# Patient Record
Sex: FEMALE | Race: BLACK OR AFRICAN AMERICAN | URBAN - METROPOLITAN AREA
[De-identification: names, ages, dates, MRNs, and addresses within clinical notes are randomized per-mention and may not be internally consistent; named-entity substitution may affect disease eponyms.]

---

## 2020-09-11 ENCOUNTER — LAB SERVICES (OUTPATIENT)
Dept: LAB | Age: 25
End: 2020-09-11

## 2020-09-11 DIAGNOSIS — Z01.812 ENCOUNTER FOR PREPROCEDURAL LABORATORY EXAMINATION: ICD-10-CM

## 2020-09-11 DIAGNOSIS — Z01.812 ENCOUNTER FOR PREPROCEDURAL LABORATORY EXAMINATION: Primary | ICD-10-CM

## 2020-09-11 PROCEDURE — 87635 SARS-COV-2 COVID-19 AMP PRB: CPT | Performed by: INTERNAL MEDICINE

## 2020-09-12 LAB
SARS-COV-2 RNA RESP QL NAA+PROBE: NOT DETECTED
SERVICE CMNT-IMP: NORMAL
SPECIMEN SOURCE: NORMAL

## 2020-09-14 ENCOUNTER — HOSPITAL (OUTPATIENT)
Dept: OTHER | Age: 25
End: 2020-09-14
Attending: INTERNAL MEDICINE

## 2020-09-15 LAB — PATHOLOGIST NAME: NORMAL

## 2022-08-22 ENCOUNTER — OFFICE VISIT (OUTPATIENT)
Dept: OBGYN CLINIC | Facility: CLINIC | Age: 27
End: 2022-08-22
Payer: COMMERCIAL

## 2022-08-22 ENCOUNTER — LAB ENCOUNTER (OUTPATIENT)
Dept: LAB | Facility: REFERENCE LAB | Age: 27
End: 2022-08-22
Attending: OBSTETRICS & GYNECOLOGY
Payer: COMMERCIAL

## 2022-08-22 VITALS
DIASTOLIC BLOOD PRESSURE: 80 MMHG | WEIGHT: 146.31 LBS | BODY MASS INDEX: 26.93 KG/M2 | HEIGHT: 62 IN | SYSTOLIC BLOOD PRESSURE: 140 MMHG

## 2022-08-22 DIAGNOSIS — Z11.3 ROUTINE SCREENING FOR STI (SEXUALLY TRANSMITTED INFECTION): ICD-10-CM

## 2022-08-22 DIAGNOSIS — N91.1 SECONDARY AMENORRHEA: ICD-10-CM

## 2022-08-22 DIAGNOSIS — N91.1 SECONDARY AMENORRHEA: Primary | ICD-10-CM

## 2022-08-22 LAB
ANTIBODY SCREEN: NEGATIVE
BASOPHILS # BLD AUTO: 0.06 X10(3) UL (ref 0–0.2)
BASOPHILS NFR BLD AUTO: 0.5 %
CONTROL LINE PRESENT WITH A CLEAR BACKGROUND (YES/NO): YES YES/NO
DEPRECATED RDW RBC AUTO: 48.1 FL (ref 35.1–46.3)
EOSINOPHIL # BLD AUTO: 0.72 X10(3) UL (ref 0–0.7)
EOSINOPHIL NFR BLD AUTO: 6 %
ERYTHROCYTE [DISTWIDTH] IN BLOOD BY AUTOMATED COUNT: 16.8 % (ref 11–15)
HBV SURFACE AG SER-ACNC: <0.1 [IU]/L
HBV SURFACE AG SERPL QL IA: NONREACTIVE
HCT VFR BLD AUTO: 37.5 %
HCV AB SERPL QL IA: NONREACTIVE
HGB BLD-MCNC: 12.1 G/DL
IMM GRANULOCYTES # BLD AUTO: 0.05 X10(3) UL (ref 0–1)
IMM GRANULOCYTES NFR BLD: 0.4 %
LYMPHOCYTES # BLD AUTO: 2.3 X10(3) UL (ref 1–4)
LYMPHOCYTES NFR BLD AUTO: 19 %
MCH RBC QN AUTO: 25.5 PG (ref 26–34)
MCHC RBC AUTO-ENTMCNC: 32.3 G/DL (ref 31–37)
MCV RBC AUTO: 78.9 FL
MONOCYTES # BLD AUTO: 0.58 X10(3) UL (ref 0.1–1)
MONOCYTES NFR BLD AUTO: 4.8 %
NEUTROPHILS # BLD AUTO: 8.38 X10 (3) UL (ref 1.5–7.7)
NEUTROPHILS # BLD AUTO: 8.38 X10(3) UL (ref 1.5–7.7)
NEUTROPHILS NFR BLD AUTO: 69.3 %
PLATELET # BLD AUTO: 267 10(3)UL (ref 150–450)
PREGNANCY TEST, URINE: POSITIVE
RBC # BLD AUTO: 4.75 X10(6)UL
RH BLOOD TYPE: POSITIVE
RH BLOOD TYPE: POSITIVE
RUBV IGG SER QL: POSITIVE
RUBV IGG SER-ACNC: 97.2 IU/ML (ref 10–?)
WBC # BLD AUTO: 12.1 X10(3) UL (ref 4–11)

## 2022-08-22 PROCEDURE — 87624 HPV HI-RISK TYP POOLED RSLT: CPT | Performed by: OBSTETRICS & GYNECOLOGY

## 2022-08-22 PROCEDURE — 87340 HEPATITIS B SURFACE AG IA: CPT

## 2022-08-22 PROCEDURE — 87591 N.GONORRHOEAE DNA AMP PROB: CPT | Performed by: OBSTETRICS & GYNECOLOGY

## 2022-08-22 PROCEDURE — 86762 RUBELLA ANTIBODY: CPT

## 2022-08-22 PROCEDURE — 86900 BLOOD TYPING SEROLOGIC ABO: CPT

## 2022-08-22 PROCEDURE — 85025 COMPLETE CBC W/AUTO DIFF WBC: CPT

## 2022-08-22 PROCEDURE — 86780 TREPONEMA PALLIDUM: CPT

## 2022-08-22 PROCEDURE — 87491 CHLMYD TRACH DNA AMP PROBE: CPT | Performed by: OBSTETRICS & GYNECOLOGY

## 2022-08-22 PROCEDURE — 86850 RBC ANTIBODY SCREEN: CPT

## 2022-08-22 PROCEDURE — 86901 BLOOD TYPING SEROLOGIC RH(D): CPT

## 2022-08-22 PROCEDURE — 36415 COLL VENOUS BLD VENIPUNCTURE: CPT

## 2022-08-22 PROCEDURE — 87389 HIV-1 AG W/HIV-1&-2 AB AG IA: CPT

## 2022-08-22 PROCEDURE — 86803 HEPATITIS C AB TEST: CPT

## 2022-08-22 RX ORDER — CHOLECALCIFEROL (VITAMIN D3) 25 MCG
1 TABLET,CHEWABLE ORAL DAILY
COMMUNITY

## 2022-08-23 LAB
C TRACH DNA SPEC QL NAA+PROBE: NEGATIVE
HPV I/H RISK 1 DNA SPEC QL NAA+PROBE: NEGATIVE
N GONORRHOEA DNA SPEC QL NAA+PROBE: NEGATIVE

## 2022-08-24 LAB — T PALLIDUM AB SER QL: NEGATIVE

## 2022-09-02 ENCOUNTER — PATIENT MESSAGE (OUTPATIENT)
Dept: OBGYN CLINIC | Facility: CLINIC | Age: 27
End: 2022-09-02

## 2022-09-03 ENCOUNTER — PATIENT MESSAGE (OUTPATIENT)
Dept: OBGYN CLINIC | Facility: CLINIC | Age: 27
End: 2022-09-03

## 2022-09-04 ENCOUNTER — MOBILE ENCOUNTER (OUTPATIENT)
Dept: OBGYN CLINIC | Facility: CLINIC | Age: 27
End: 2022-09-04

## 2022-09-04 RX ORDER — CYCLOBENZAPRINE HCL 10 MG
10 TABLET ORAL 3 TIMES DAILY PRN
Qty: 20 TABLET | Refills: 0 | Status: SHIPPED | OUTPATIENT
Start: 2022-09-04 | End: 2022-09-06

## 2022-09-06 ENCOUNTER — TELEPHONE (OUTPATIENT)
Dept: OBGYN CLINIC | Facility: CLINIC | Age: 27
End: 2022-09-06

## 2022-09-06 ENCOUNTER — OFFICE VISIT (OUTPATIENT)
Dept: OBGYN CLINIC | Facility: CLINIC | Age: 27
End: 2022-09-06
Payer: COMMERCIAL

## 2022-09-06 VITALS
HEIGHT: 62 IN | SYSTOLIC BLOOD PRESSURE: 120 MMHG | BODY MASS INDEX: 26.94 KG/M2 | DIASTOLIC BLOOD PRESSURE: 74 MMHG | WEIGHT: 146.38 LBS

## 2022-09-06 DIAGNOSIS — Z34.82 ENCOUNTER FOR SUPERVISION OF OTHER NORMAL PREGNANCY IN SECOND TRIMESTER: Primary | ICD-10-CM

## 2022-09-06 DIAGNOSIS — N94.9 ROUND LIGAMENT PAIN: ICD-10-CM

## 2022-09-06 DIAGNOSIS — N91.1 SECONDARY AMENORRHEA: ICD-10-CM

## 2022-09-06 PROBLEM — Z34.90 SUPERVISION OF NORMAL PREGNANCY: Status: ACTIVE | Noted: 2022-09-06

## 2022-09-06 PROBLEM — Z34.90 SUPERVISION OF NORMAL PREGNANCY (HCC): Status: ACTIVE | Noted: 2022-09-06

## 2022-09-06 LAB
BILIRUB UR QL: NEGATIVE
CLARITY UR: CLEAR
COLOR UR: YELLOW
GLUCOSE (URINE DIPSTICK): NEGATIVE MG/DL
GLUCOSE UR-MCNC: NEGATIVE MG/DL
HGB UR QL STRIP.AUTO: NEGATIVE
KETONES UR-MCNC: 40 MG/DL
LEUKOCYTE ESTERASE UR QL STRIP.AUTO: NEGATIVE
MULTISTIX LOT#: NORMAL NUMERIC
NITRITE UR QL STRIP.AUTO: NEGATIVE
PH UR: 5.5 [PH] (ref 5–8)
PROT UR-MCNC: NEGATIVE MG/DL
SP GR UR STRIP: 1.02 (ref 1–1.03)
UROBILINOGEN UR STRIP-ACNC: 0.2

## 2022-09-06 PROCEDURE — 3008F BODY MASS INDEX DOCD: CPT | Performed by: OBSTETRICS & GYNECOLOGY

## 2022-09-06 PROCEDURE — 87205 SMEAR GRAM STAIN: CPT | Performed by: OBSTETRICS & GYNECOLOGY

## 2022-09-06 PROCEDURE — 81002 URINALYSIS NONAUTO W/O SCOPE: CPT | Performed by: OBSTETRICS & GYNECOLOGY

## 2022-09-06 PROCEDURE — 87106 FUNGI IDENTIFICATION YEAST: CPT | Performed by: OBSTETRICS & GYNECOLOGY

## 2022-09-06 PROCEDURE — 3078F DIAST BP <80 MM HG: CPT | Performed by: OBSTETRICS & GYNECOLOGY

## 2022-09-06 PROCEDURE — 87086 URINE CULTURE/COLONY COUNT: CPT | Performed by: OBSTETRICS & GYNECOLOGY

## 2022-09-06 PROCEDURE — 3074F SYST BP LT 130 MM HG: CPT | Performed by: OBSTETRICS & GYNECOLOGY

## 2022-09-06 PROCEDURE — 81003 URINALYSIS AUTO W/O SCOPE: CPT | Performed by: OBSTETRICS & GYNECOLOGY

## 2022-09-06 PROCEDURE — 87808 TRICHOMONAS ASSAY W/OPTIC: CPT | Performed by: OBSTETRICS & GYNECOLOGY

## 2022-09-06 RX ORDER — CYCLOBENZAPRINE HCL 10 MG
10 TABLET ORAL 3 TIMES DAILY PRN
Qty: 20 TABLET | Refills: 0 | Status: SHIPPED | OUTPATIENT
Start: 2022-09-06

## 2022-09-06 NOTE — TELEPHONE ENCOUNTER
See RD's telephone encounter on 09/04/2022. From: Niall Degree  Sent: 9/3/2022  6:21 PM CDT  To: [facility] Clinical Staff  Subject: Pain in Abdomen     I went to the ER and they couldn't find anything really wrong to cause my pain. I went to [facility].   My pain has gotten so much worse since then. It's more like a stabbing pain and is spreading. My  and I were wondering if it could be a cause of my constipation? Would a colonic help?     Thank you,  -Magdaleno Martinez

## 2022-09-06 NOTE — TELEPHONE ENCOUNTER
----- Message from Randolph Narvaez MD sent at 2022 12:06 PM CDT -----  Regarding: Genetic screening  This patient stated she did cell free DNA for her pregnancy. I do not see it in her labs. Can you confirm it has been done and if not then do RN education visit and do genetic screening. Thank you. Dr. Dalia Kemp       Reviewed chart noted pt went to ED on 2022, had dating ultrasound. (see Below)    Result Date: 2022  [address] [place] [address] Street REPORT Phone: [phone] ---------------------------------------------------------------------- Tolu Lucia. Name: Norma Silva.  No: [id number] Study Date: 2022 6:46pm , Age: 1995, 27 Pregnancies:  2, Para 0, Ab 1 LMP: Unknown GA by US: 14w4d JUAN: 2023 GA Selected: 14w4d (From Known E) JUAN: 2023 Referring MD: Emergency, Physcian Sonographer: Patricia Og CPT4: [id number] Previous Exam:None Hist/Ind: Abdominal pain ---------------------------------------------------------------------- MEASUREMENTS & FETAL AGE FETAL GROWTH EVALUATION Measurement GA Range Srce %for GA Ratios ----------- ----- ------------- ---- ------- ------------------------- CRL 8.7 cm 14w4d ([id number]) Hadl CRL 51% GA for sonogram 14w4d ([id number]) based on (CRL) Avg Fetal Heart Rate: 160 bpm ---------------------------------------------------------------------- FETAL EVAL, PLACENTA Fetal Heart Rate: 160 bpm ---------------------------------------------------------------------- MATERNAL ANATOMY ---------------------------------------------------------------------- Uterus LxHxW (cm) Size: 16.5 x 8.7 x 12.8 Vol: 962.1cc ---------------------------------------------------------------------- Fibroids LxHxW (cm) 1: 5.1 x 3.1 x 4.2 Loc: Anterior 2: 3.8 x 3.9 x 5.5 Loc: Anterior 2 3: 4.6 x 5.7 x 4.7 Loc: Posterior ---------------------------------------------------------------------- Ovaries LxHxW (cm) Right 3.1 x 2.2 x 2.4 Vol: 8.6cc Left 2.0 x 1.6 x 1.8 Vol: 3.0cc ---------------------------------------------------------------------- ---------------------------------------------------------------------- CLINICAL SUMMARY Findings: Transabdominal sonographic examination of performed. A single intrauterine gestational sac was identified. The estimated gestational age was determined from baseline study, which is documented above. Cardiac activity was observed. The uterus measures 16.5 X 8.7 X 12.8 cm. The uterus contains multiple fibroids; Largest measured (documented above). The cervix is normal . The right ovary measures 3.1 X 2.2 X 2.4 cm. The right ovary is within normal limits. The left ovary measures 1.6 X 1.5 X 1.3 cm. The left ovary is within normal limits. The left adnexa contains a paraovarian cyst measuring 1.6 X 1.5 X 1.3 cm. Blood flow is seen to both ovaries. There is no free fluid within the pelvic cul-de-sac. The bladder is unremarkable. Final Impression: 1. Single live intrauterine gestation with EGA of 14 weeks 4 days based on provided JUAN. 2. Fibroid uterus with largest measuring 5.7 cm. 3. Left paraovarian cyst measuring 1.6 cm. Result Code:x Donna Sharma M.D. ------------------------ <Electronic Signature> 09/02/2022 07:40pm       Called pt, informed no need to repeat dating ultrasound but will speak with Veda Suazo first.  Scheduled pt for RN OB ED over the phone 09/16/2022 at 10 am.  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 Called pt informed prequel pending, +Carrier and need to test FOB. Pt verbalized understanding.

## 2022-09-08 ENCOUNTER — TELEPHONE (OUTPATIENT)
Dept: OBGYN CLINIC | Facility: CLINIC | Age: 27
End: 2022-09-08

## 2022-09-08 LAB
GENITAL VAGINOSIS SCREEN: NEGATIVE
TRICHOMONAS SCREEN: NEGATIVE

## 2022-09-08 NOTE — TELEPHONE ENCOUNTER
FOB here with pt, given saliva test for foresight carrier screen. Also provided pt with gender envelope. Both verbalized understanding.

## 2022-09-09 PROBLEM — Z14.8 GENETIC CARRIER: Status: ACTIVE | Noted: 2022-09-09

## 2022-09-09 PROBLEM — Z13.79 GENETIC SCREENING: Status: ACTIVE | Noted: 2022-09-09

## 2022-09-12 ENCOUNTER — MED REC SCAN ONLY (OUTPATIENT)
Dept: OBGYN CLINIC | Facility: CLINIC | Age: 27
End: 2022-09-12

## 2022-09-16 ENCOUNTER — NURSE ONLY (OUTPATIENT)
Dept: OBGYN CLINIC | Facility: CLINIC | Age: 27
End: 2022-09-16
Payer: COMMERCIAL

## 2022-09-16 NOTE — PROGRESS NOTES
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 Pregnancy Confirmation apt with: Kerri Almazan    LMP: UNKNOWN    US: 2022 at 15 w 4 d at [facility]    Working JUAN:  2023    Pre  BMI:   22.86    Medical Hx significant for: Hypoglycemia, asthma, anemia    Obstetrical Hx significant for: ab 1    Surgical Hx significant for: denies    EPDS score: 11 Per pt seeing therapist this Saturday. CSSR between three months and a year ago attempted to harm self. Informed JN. OUD Screening: Patient has answered NO to 5p questions and has no  risk factors. Patient given \"What Pregnant Women Need to Know\" handout. Educational material reviewed with patient: Prenatal care, nutrition, weight gain recommendations, travel, exercise, intercourse, pregnancy changes, safe medications, pregnancy and work, fetal movement, labor and  labor, warning signs, food safety, tdap, cord blood, breastfeeding, circumcision, and Group B strep. Pt agrees to blood transfusion if needed: yes    PN labs completed     Optional genetic screening discussed. Pt completed foresight and prequel:    [facility] Media Policy: Reviewed and verbalized understanding.      NOB appt: completed    Lab appt: completed

## 2022-09-20 ENCOUNTER — TELEPHONE (OUTPATIENT)
Dept: OBGYN CLINIC | Facility: CLINIC | Age: 27
End: 2022-09-20

## 2022-09-20 ENCOUNTER — MED REC SCAN ONLY (OUTPATIENT)
Dept: OBGYN CLINIC | Facility: CLINIC | Age: 27
End: 2022-09-20

## 2022-09-21 ENCOUNTER — PATIENT MESSAGE (OUTPATIENT)
Dept: OBGYN CLINIC | Facility: CLINIC | Age: 27
End: 2022-09-21

## 2022-09-21 NOTE — TELEPHONE ENCOUNTER
From: Darleen Cedeño  Sent: 9/21/2022 11:21 AM CDT  To: [facility] Ob Clinical Staff  Subject: reschedule Appointment    I'm sorry, but I am at work. I did not request a reschedule. Is this at the request of Dr. Krzysztof Lanza?

## 2022-10-10 ENCOUNTER — HOSPITAL ENCOUNTER (OUTPATIENT)
Dept: ULTRASOUND IMAGING | Facility: HOSPITAL | Age: 27
Discharge: HOME | End: 2022-10-10
Attending: OBSTETRICS & GYNECOLOGY
Payer: COMMERCIAL

## 2022-10-10 DIAGNOSIS — Z34.82 ENCOUNTER FOR SUPERVISION OF OTHER NORMAL PREGNANCY IN SECOND TRIMESTER: ICD-10-CM

## 2022-10-10 PROCEDURE — 76805 OB US >/= 14 WKS SNGL FETUS: CPT | Performed by: OBSTETRICS & GYNECOLOGY

## 2022-10-18 ENCOUNTER — ROUTINE PRENATAL (OUTPATIENT)
Dept: OBGYN CLINIC | Facility: CLINIC | Age: 27
End: 2022-10-18
Payer: COMMERCIAL

## 2022-10-18 VITALS
BODY MASS INDEX: 29.08 KG/M2 | HEIGHT: 62 IN | DIASTOLIC BLOOD PRESSURE: 72 MMHG | SYSTOLIC BLOOD PRESSURE: 118 MMHG | WEIGHT: 158 LBS

## 2022-10-18 DIAGNOSIS — Z36.9 UNSPECIFIED ANTENATAL SCREENING: Primary | ICD-10-CM

## 2022-10-18 PROCEDURE — 3074F SYST BP LT 130 MM HG: CPT | Performed by: OBSTETRICS & GYNECOLOGY

## 2022-10-18 PROCEDURE — 3078F DIAST BP <80 MM HG: CPT | Performed by: OBSTETRICS & GYNECOLOGY

## 2022-10-18 PROCEDURE — 3008F BODY MASS INDEX DOCD: CPT | Performed by: OBSTETRICS & GYNECOLOGY

## 2022-10-18 NOTE — PROGRESS NOTES
Doing well. No OB complaints. +FM. Some LLQ pain over fibroid. Worse with some movements. Rec ice packs and PRN tylenol. ABHIJEET 3 weeks 1h GTT and CBC ordered for next visit. Dr. Andriy Castellon MD    [facility] 10 OBGYN     This note was created by [facility] voice recognition. Errors in content may be related to improper recognition by the system; efforts to review and correct have been done but errors may still exist. Please be advised the primary purpose of this note is for me to communicate medical care. Standard sentence structure is not always used. Medical terminology and medical abbreviations may be used. There may be grammatical, typographical, and automated fill ins that may have errors missed in proofreading.

## 2022-11-12 ENCOUNTER — MOBILE ENCOUNTER (OUTPATIENT)
Dept: OBGYN CLINIC | Facility: CLINIC | Age: 27
End: 2022-11-12

## 2022-11-12 ENCOUNTER — HOSPITAL ENCOUNTER (OUTPATIENT)
Facility: HOSPITAL | Age: 27
Setting detail: OBSERVATION
Discharge: HOME | End: 2022-11-14
Attending: OBSTETRICS & GYNECOLOGY | Admitting: OBSTETRICS & GYNECOLOGY
Payer: COMMERCIAL

## 2022-11-12 PROBLEM — Z34.90 PREGNANCY (HCC): Status: ACTIVE | Noted: 2022-11-12

## 2022-11-12 PROBLEM — Z34.90 PREGNANCY: Status: ACTIVE | Noted: 2022-11-12

## 2022-11-12 LAB
BASOPHILS # BLD AUTO: 0.04 X10(3) UL (ref 0–0.2)
BASOPHILS NFR BLD AUTO: 0.3 %
BILIRUB UR QL: NEGATIVE
COLOR UR: YELLOW
DEPRECATED RDW RBC AUTO: 41.1 FL (ref 35.1–46.3)
EOSINOPHIL # BLD AUTO: 0.77 X10(3) UL (ref 0–0.7)
EOSINOPHIL NFR BLD AUTO: 5.1 %
ERYTHROCYTE [DISTWIDTH] IN BLOOD BY AUTOMATED COUNT: 15.2 % (ref 11–15)
GLUCOSE UR-MCNC: NEGATIVE MG/DL
HCT VFR BLD AUTO: 34.8 %
HGB BLD-MCNC: 11.2 G/DL
HGB UR QL STRIP.AUTO: NEGATIVE
IMM GRANULOCYTES # BLD AUTO: 0.05 X10(3) UL (ref 0–1)
IMM GRANULOCYTES NFR BLD: 0.3 %
KETONES UR-MCNC: 20 MG/DL
LEUKOCYTE ESTERASE UR QL STRIP.AUTO: NEGATIVE
LYMPHOCYTES # BLD AUTO: 2.87 X10(3) UL (ref 1–4)
LYMPHOCYTES NFR BLD AUTO: 19.1 %
MCH RBC QN AUTO: 24.5 PG (ref 26–34)
MCHC RBC AUTO-ENTMCNC: 32.2 G/DL (ref 31–37)
MCV RBC AUTO: 76 FL
MONOCYTES # BLD AUTO: 0.83 X10(3) UL (ref 0.1–1)
MONOCYTES NFR BLD AUTO: 5.5 %
NEUTROPHILS # BLD AUTO: 10.5 X10 (3) UL (ref 1.5–7.7)
NEUTROPHILS # BLD AUTO: 10.5 X10(3) UL (ref 1.5–7.7)
NEUTROPHILS NFR BLD AUTO: 69.7 %
NITRITE UR QL STRIP.AUTO: NEGATIVE
PH UR: 5 [PH] (ref 5–8)
PLATELET # BLD AUTO: 316 10(3)UL (ref 150–450)
PROT UR-MCNC: 30 MG/DL
RBC # BLD AUTO: 4.58 X10(6)UL
SP GR UR STRIP: >1.03 (ref 1–1.03)
UROBILINOGEN UR STRIP-ACNC: <2
VIT C UR-MCNC: NEGATIVE MG/DL
WBC # BLD AUTO: 15.1 X10(3) UL (ref 4–11)

## 2022-11-12 RX ORDER — ACETAMINOPHEN 500 MG
1000 TABLET ORAL EVERY 6 HOURS PRN
Status: DISCONTINUED | OUTPATIENT
Start: 2022-11-12 | End: 2022-11-13

## 2022-11-12 RX ORDER — CALCIUM CARBONATE 200(500)MG
1000 TABLET,CHEWABLE ORAL 2 TIMES DAILY PRN
Status: DISCONTINUED | OUTPATIENT
Start: 2022-11-12 | End: 2022-11-14

## 2022-11-12 RX ORDER — FAMOTIDINE 10 MG/ML
20 INJECTION, SOLUTION INTRAVENOUS 2 TIMES DAILY
Status: DISCONTINUED | OUTPATIENT
Start: 2022-11-12 | End: 2022-11-13

## 2022-11-12 RX ORDER — ONDANSETRON 2 MG/ML
INJECTION INTRAMUSCULAR; INTRAVENOUS
Status: COMPLETED
Start: 2022-11-12 | End: 2022-11-12

## 2022-11-12 RX ORDER — FAMOTIDINE 20 MG/1
20 TABLET, FILM COATED ORAL 2 TIMES DAILY
Status: DISCONTINUED | OUTPATIENT
Start: 2022-11-12 | End: 2022-11-13

## 2022-11-12 RX ORDER — ONDANSETRON 2 MG/ML
4 INJECTION INTRAMUSCULAR; INTRAVENOUS EVERY 6 HOURS PRN
Status: DISCONTINUED | OUTPATIENT
Start: 2022-11-12 | End: 2022-11-14

## 2022-11-12 RX ORDER — ACETAMINOPHEN 500 MG
TABLET ORAL
Status: COMPLETED
Start: 2022-11-12 | End: 2022-11-12

## 2022-11-12 NOTE — PROGRESS NOTES
Pt is a 32year old female admitted to TR1/TR1-A. Patient presents with:   Assessment: L lower abd pain since Tuesday, increasing today, Pt reported feeling a hard lump on her lower L side. Pt is  24w5d intra-uterine pregnancy. History obtained, consents signed. Oriented to room, staff, and plan of care.

## 2022-11-13 ENCOUNTER — APPOINTMENT (OUTPATIENT)
Dept: ULTRASOUND IMAGING | Facility: HOSPITAL | Age: 27
End: 2022-11-13
Attending: OBSTETRICS & GYNECOLOGY
Payer: COMMERCIAL

## 2022-11-13 LAB
ALBUMIN SERPL-MCNC: 2.4 G/DL (ref 3.4–5)
ALBUMIN/GLOB SERPL: 0.6 {RATIO} (ref 1–2)
ALP LIVER SERPL-CCNC: 74 U/L
ALT SERPL-CCNC: 9 U/L
AMYLASE SERPL-CCNC: 77 U/L (ref 25–115)
ANION GAP SERPL CALC-SCNC: 7 MMOL/L (ref 0–18)
AST SERPL-CCNC: 10 U/L (ref 15–37)
BASOPHILS # BLD AUTO: 0.05 X10(3) UL (ref 0–0.2)
BASOPHILS NFR BLD AUTO: 0.4 %
BILIRUB SERPL-MCNC: 0.5 MG/DL (ref 0.1–2)
BUN BLD-MCNC: 6 MG/DL (ref 7–18)
BUN/CREAT SERPL: 9.4 (ref 10–20)
CALCIUM BLD-MCNC: 8.4 MG/DL (ref 8.5–10.1)
CHLORIDE SERPL-SCNC: 107 MMOL/L (ref 98–112)
CO2 SERPL-SCNC: 25 MMOL/L (ref 21–32)
CREAT BLD-MCNC: 0.64 MG/DL
DEPRECATED RDW RBC AUTO: 41.4 FL (ref 35.1–46.3)
EOSINOPHIL # BLD AUTO: 0.92 X10(3) UL (ref 0–0.7)
EOSINOPHIL NFR BLD AUTO: 7.6 %
ERYTHROCYTE [DISTWIDTH] IN BLOOD BY AUTOMATED COUNT: 15.2 % (ref 11–15)
FASTING STATUS PATIENT QL REPORTED: NO
GFR SERPLBLD BASED ON 1.73 SQ M-ARVRAT: 123 ML/MIN/1.73M2 (ref 60–?)
GLOBULIN PLAS-MCNC: 4 G/DL (ref 2.8–4.4)
GLUCOSE BLD-MCNC: 85 MG/DL (ref 70–99)
HCT VFR BLD AUTO: 31.3 %
HGB BLD-MCNC: 9.9 G/DL
IMM GRANULOCYTES # BLD AUTO: 0.05 X10(3) UL (ref 0–1)
IMM GRANULOCYTES NFR BLD: 0.4 %
LIPASE SERPL-CCNC: 144 U/L (ref 73–393)
LYMPHOCYTES # BLD AUTO: 2.62 X10(3) UL (ref 1–4)
LYMPHOCYTES NFR BLD AUTO: 21.8 %
MCH RBC QN AUTO: 24.3 PG (ref 26–34)
MCHC RBC AUTO-ENTMCNC: 31.6 G/DL (ref 31–37)
MCV RBC AUTO: 76.7 FL
MONOCYTES # BLD AUTO: 0.66 X10(3) UL (ref 0.1–1)
MONOCYTES NFR BLD AUTO: 5.5 %
NEUTROPHILS # BLD AUTO: 7.74 X10 (3) UL (ref 1.5–7.7)
NEUTROPHILS # BLD AUTO: 7.74 X10(3) UL (ref 1.5–7.7)
NEUTROPHILS NFR BLD AUTO: 64.3 %
OSMOLALITY SERPL CALC.SUM OF ELEC: 285 MOSM/KG (ref 275–295)
PLATELET # BLD AUTO: 288 10(3)UL (ref 150–450)
POTASSIUM SERPL-SCNC: 3.9 MMOL/L (ref 3.5–5.1)
PROT SERPL-MCNC: 6.4 G/DL (ref 6.4–8.2)
RBC # BLD AUTO: 4.08 X10(6)UL
SODIUM SERPL-SCNC: 139 MMOL/L (ref 136–145)
WBC # BLD AUTO: 12 X10(3) UL (ref 4–11)

## 2022-11-13 PROCEDURE — 76705 ECHO EXAM OF ABDOMEN: CPT | Performed by: OBSTETRICS & GYNECOLOGY

## 2022-11-13 PROCEDURE — 99220 INITIAL OBSERVATION CARE,LEVL III: CPT | Performed by: OBSTETRICS & GYNECOLOGY

## 2022-11-13 RX ORDER — SIMETHICONE 80 MG
80 TABLET,CHEWABLE ORAL 4 TIMES DAILY PRN
Status: DISCONTINUED | OUTPATIENT
Start: 2022-11-13 | End: 2022-11-14

## 2022-11-13 RX ORDER — FAMOTIDINE 10 MG/ML
20 INJECTION, SOLUTION INTRAVENOUS 2 TIMES DAILY PRN
Status: DISCONTINUED | OUTPATIENT
Start: 2022-11-13 | End: 2022-11-14

## 2022-11-13 RX ORDER — MORPHINE SULFATE 2 MG/ML
2 INJECTION, SOLUTION INTRAMUSCULAR; INTRAVENOUS ONCE
Status: COMPLETED | OUTPATIENT
Start: 2022-11-13 | End: 2022-11-13

## 2022-11-13 RX ORDER — DOCUSATE SODIUM 100 MG/1
100 CAPSULE, LIQUID FILLED ORAL 2 TIMES DAILY PRN
Status: DISCONTINUED | OUTPATIENT
Start: 2022-11-13 | End: 2022-11-14

## 2022-11-13 RX ORDER — SODIUM CHLORIDE, SODIUM LACTATE, POTASSIUM CHLORIDE, CALCIUM CHLORIDE 600; 310; 30; 20 MG/100ML; MG/100ML; MG/100ML; MG/100ML
INJECTION, SOLUTION INTRAVENOUS CONTINUOUS
Status: DISCONTINUED | OUTPATIENT
Start: 2022-11-13 | End: 2022-11-14

## 2022-11-13 RX ORDER — FAMOTIDINE 20 MG/1
20 TABLET, FILM COATED ORAL 2 TIMES DAILY PRN
Status: DISCONTINUED | OUTPATIENT
Start: 2022-11-13 | End: 2022-11-14

## 2022-11-13 RX ORDER — DIPHENHYDRAMINE HYDROCHLORIDE 50 MG/ML
25 INJECTION INTRAMUSCULAR; INTRAVENOUS EVERY 4 HOURS PRN
Status: DISCONTINUED | OUTPATIENT
Start: 2022-11-13 | End: 2022-11-14

## 2022-11-13 RX ORDER — HYDROCODONE BITARTRATE AND ACETAMINOPHEN 10; 325 MG/1; MG/1
1 TABLET ORAL EVERY 4 HOURS PRN
Status: DISCONTINUED | OUTPATIENT
Start: 2022-11-13 | End: 2022-11-13

## 2022-11-13 RX ORDER — HYDROCODONE BITARTRATE AND ACETAMINOPHEN 5; 325 MG/1; MG/1
1 TABLET ORAL EVERY 6 HOURS
Status: DISCONTINUED | OUTPATIENT
Start: 2022-11-13 | End: 2022-11-14

## 2022-11-13 NOTE — PROGRESS NOTES
Patient able to tolerate 1 popsicle and 120 ml of apple juice. Patient repositioned to right side with pillow support and patient dozing.

## 2022-11-13 NOTE — PLAN OF CARE
Problem: GASTROINTESTINAL - ADULT  Goal: Minimal or absence of nausea and vomiting  Description: INTERVENTIONS:  - Maintain adequate hydration with IV or PO as ordered and tolerated  - Nasogastric tube to low intermittent suction as ordered  - Evaluate effectiveness of ordered antiemetic medications  - Provide nonpharmacologic comfort measures as appropriate  - Advance diet as tolerated, if ordered  - Obtain nutritional consult as needed  - Evaluate fluid balance  Outcome: Progressing  Goal: Maintains or returns to baseline bowel function  Description: INTERVENTIONS:  - Assess bowel function  - Maintain adequate hydration with IV or PO as ordered and tolerated  - Evaluate effectiveness of GI medications  - Encourage mobilization and activity  - Obtain nutritional consult as needed  - Establish a toileting routine/schedule  - Consider collaborating with pharmacy to review patient's medication profile  Outcome: Progressing  Goal: Maintains adequate nutritional intake (undernourished)  Description: INTERVENTIONS:  - Monitor percentage of each meal consumed  - Identify factors contributing to decreased intake, treat as appropriate  - Assist with meals as needed  - Monitor I&O, WT and lab values  - Obtain nutritional consult as needed  - Optimize oral hygiene and moisture  - Encourage food from home; allow for food preferences  - Enhance eating environment  Outcome: Progressing  Goal: Achieves appropriate nutritional intake (bariatric)  Description: INTERVENTIONS:  - Monitor for over-consumption  - Identify factors contributing to increased intake, treat as appropriate  - Monitor I&O, WT and lab values  - Obtain nutritional consult as needed  - Evaluate psychosocial factors contributing to over-consumption  Outcome: Progressing     Problem: ANTEPARTUM/LABOR and DELIVERY  Goal: Maintain pregnancy as long as maternal and/or fetal condition is stable  Description: INTERVENTIONS:  - Maternal surveillance  - Fetal surveillance  - Monitor uterine activity  - Medications as ordered  - Bedrest  Outcome: Progressing  Goal: Anxiety is at manageable level  Description: INTERVENTIONS:  - [place] patient to unit/surroundings  - Establish a trusting relationship with patient  - Discuss possible complications or alterations in birth plan  - Explain treatment plan  - Explain testing/procedures prior to initiation  - Encourage participation in care  - Encourage verbalization of concerns/fears  - Identify coping mechanisms  - Administer/offer alternative therapies (Aroma therapy, distraction, guided imagery, massage, music therapy, therapeutic touch)  - Manage patient's environment (Avoid overstimulation of patient)  Outcome: Progressing  Goal: Demonstrates ability to cope with hospitalization/illness  Description: INTERVENTIONS:  - Encourage verbalization of feelings/concerns/expectations  - Provide quiet environment  - Assist patient to identify own strengths and abilities  - Encourage patient to set small goals for self  - Encourage participation in diversional activity  - Reinforce positive adaptation of new coping behaviors  - Include patient/family/caregiver in decisions  Outcome: Progressing

## 2022-11-14 ENCOUNTER — HOSPITAL ENCOUNTER (OUTPATIENT)
Facility: HOSPITAL | Age: 27
End: 2022-11-14
Attending: OBSTETRICS & GYNECOLOGY | Admitting: OBSTETRICS & GYNECOLOGY
Payer: COMMERCIAL

## 2022-11-14 VITALS
SYSTOLIC BLOOD PRESSURE: 131 MMHG | DIASTOLIC BLOOD PRESSURE: 82 MMHG | HEART RATE: 80 BPM | RESPIRATION RATE: 15 BRPM | TEMPERATURE: 98 F

## 2022-11-14 PROCEDURE — 99224 SUBSEQUENT OBSERVATION CARE: CPT | Performed by: OBSTETRICS & GYNECOLOGY

## 2022-11-14 RX ORDER — CEPHALEXIN 500 MG/1
500 CAPSULE ORAL 4 TIMES DAILY
Qty: 32 CAPSULE | Refills: 0 | Status: SHIPPED | OUTPATIENT
Start: 2022-11-14 | End: 2022-11-22

## 2022-11-14 RX ORDER — HYDROCODONE BITARTRATE AND ACETAMINOPHEN 5; 325 MG/1; MG/1
1 TABLET ORAL EVERY 6 HOURS PRN
Qty: 30 TABLET | Refills: 0 | Status: SHIPPED | OUTPATIENT
Start: 2022-11-14

## 2022-11-14 NOTE — DISCHARGE INSTRUCTIONS
Call if fever greater than 101, worsening pain, nausea or vomiting, heavy vaginal bleeding, decreased fetal movement, painful contractions. You may drive once you are no longer taking narcotics (Sachse). Please call if you have persistent headaches, worsening swelling, pain under your right ribs, or blurry vision.

## 2022-11-14 NOTE — PROGRESS NOTES
Still having L sided LAP over fibroid, using norco, but feels like pain is overall better. No further nausea or vomiting. No fever or chills. + FM, no contractions. No vaginal bleeding. AFEB VSS  ABD: + L abdominal tenderness over uterine fibroid  EXT No calf tenderness    33 y/o  at 22 W admitted with UTI and L sided LAP believed due to fibroid. I dw patient and her partner that fibroid pain is usually self limited and should not effect the pregnancy. Plan home with pain meds, antibiotics. Plan FU in 1 W.

## 2022-11-14 NOTE — DISCHARGE SUMMARY
[facility]    Discharge Summary    [facility] Patient Status:  Outpatient    1995 MRN [id number]   Location [address] Attending Jaspal Marc MD   Hosp Day # 0 PCP No primary care provider on file. Admit Date: 2022    Discharge Date : 2022    Attending Physician: Jaspal Marc MD    Reason for Admission:  31 y/o  at 22 W admitted with lower abdominal pain due to fibroid and urinary tract infection. She received pain medicines and antibiotics. Discharge Diagnoses: abdominal pain in pregnancy, fibroids in pregnancy, urinary tract infection  Discharged Condition: good    Disposition: home    Patient Instructions: Follow-up appointment with 1 week with Dr. Ivy Luther.      Cabrera Malhotra MD  2022  9:52 AM

## 2022-11-14 NOTE — PROGRESS NOTES
Patient discharge to home. This nurse discussed discharge medications and future appointments. Patient and significant other verbalized understanding, all questions answered. IV removed. Patient safely transported off unit via wheelchair in stable condition.

## 2022-11-18 ENCOUNTER — LAB ENCOUNTER (OUTPATIENT)
Dept: LAB | Facility: REFERENCE LAB | Age: 27
End: 2022-11-18
Attending: OBSTETRICS & GYNECOLOGY
Payer: COMMERCIAL

## 2022-11-18 ENCOUNTER — ROUTINE PRENATAL (OUTPATIENT)
Dept: OBGYN CLINIC | Facility: CLINIC | Age: 27
End: 2022-11-18
Payer: COMMERCIAL

## 2022-11-18 ENCOUNTER — TELEPHONE (OUTPATIENT)
Dept: OBGYN CLINIC | Facility: CLINIC | Age: 27
End: 2022-11-18

## 2022-11-18 VITALS
DIASTOLIC BLOOD PRESSURE: 74 MMHG | BODY MASS INDEX: 28.74 KG/M2 | HEIGHT: 62 IN | SYSTOLIC BLOOD PRESSURE: 122 MMHG | WEIGHT: 156.19 LBS

## 2022-11-18 DIAGNOSIS — R10.9 ABDOMINAL PAIN AFFECTING PREGNANCY: Primary | ICD-10-CM

## 2022-11-18 DIAGNOSIS — Z34.82 ENCOUNTER FOR SUPERVISION OF OTHER NORMAL PREGNANCY IN SECOND TRIMESTER: ICD-10-CM

## 2022-11-18 DIAGNOSIS — O34.10 UTERINE FIBROID DURING PREGNANCY, ANTEPARTUM: ICD-10-CM

## 2022-11-18 DIAGNOSIS — D25.9 UTERINE FIBROID DURING PREGNANCY, ANTEPARTUM: ICD-10-CM

## 2022-11-18 DIAGNOSIS — O26.899 ABDOMINAL PAIN AFFECTING PREGNANCY: Primary | ICD-10-CM

## 2022-11-18 DIAGNOSIS — Z36.9 UNSPECIFIED ANTENATAL SCREENING: ICD-10-CM

## 2022-11-18 LAB
BASOPHILS # BLD AUTO: 0.05 X10(3) UL (ref 0–0.2)
BASOPHILS NFR BLD AUTO: 0.5 %
DEPRECATED RDW RBC AUTO: 43.1 FL (ref 35.1–46.3)
EOSINOPHIL # BLD AUTO: 1.18 X10(3) UL (ref 0–0.7)
EOSINOPHIL NFR BLD AUTO: 11.1 %
ERYTHROCYTE [DISTWIDTH] IN BLOOD BY AUTOMATED COUNT: 15.7 % (ref 11–15)
GLUCOSE 1H P GLC SERPL-MCNC: 186 MG/DL
HCT VFR BLD AUTO: 37.3 %
HGB BLD-MCNC: 11.5 G/DL
IMM GRANULOCYTES # BLD AUTO: 0.04 X10(3) UL (ref 0–1)
IMM GRANULOCYTES NFR BLD: 0.4 %
LYMPHOCYTES # BLD AUTO: 2.22 X10(3) UL (ref 1–4)
LYMPHOCYTES NFR BLD AUTO: 21 %
MCH RBC QN AUTO: 23.8 PG (ref 26–34)
MCHC RBC AUTO-ENTMCNC: 30.8 G/DL (ref 31–37)
MCV RBC AUTO: 77.2 FL
MONOCYTES # BLD AUTO: 0.34 X10(3) UL (ref 0.1–1)
MONOCYTES NFR BLD AUTO: 3.2 %
NEUTROPHILS # BLD AUTO: 6.76 X10 (3) UL (ref 1.5–7.7)
NEUTROPHILS # BLD AUTO: 6.76 X10(3) UL (ref 1.5–7.7)
NEUTROPHILS NFR BLD AUTO: 63.8 %
PLATELET # BLD AUTO: 334 10(3)UL (ref 150–450)
RBC # BLD AUTO: 4.83 X10(6)UL
WBC # BLD AUTO: 10.6 X10(3) UL (ref 4–11)

## 2022-11-18 PROCEDURE — 82950 GLUCOSE TEST: CPT

## 2022-11-18 PROCEDURE — 82105 ALPHA-FETOPROTEIN SERUM: CPT

## 2022-11-18 PROCEDURE — 36415 COLL VENOUS BLD VENIPUNCTURE: CPT

## 2022-11-18 PROCEDURE — 85060 BLOOD SMEAR INTERPRETATION: CPT

## 2022-11-18 PROCEDURE — 3074F SYST BP LT 130 MM HG: CPT | Performed by: OBSTETRICS & GYNECOLOGY

## 2022-11-18 PROCEDURE — 85025 COMPLETE CBC W/AUTO DIFF WBC: CPT

## 2022-11-18 PROCEDURE — 3078F DIAST BP <80 MM HG: CPT | Performed by: OBSTETRICS & GYNECOLOGY

## 2022-11-18 PROCEDURE — 3008F BODY MASS INDEX DOCD: CPT | Performed by: OBSTETRICS & GYNECOLOGY

## 2022-11-18 RX ORDER — HYDROCODONE BITARTRATE AND ACETAMINOPHEN 5; 325 MG/1; MG/1
1 TABLET ORAL EVERY 4 HOURS PRN
Qty: 30 TABLET | Refills: 0 | Status: SHIPPED | OUTPATIENT
Start: 2022-11-18

## 2022-11-18 RX ORDER — INDOMETHACIN 25 MG/1
25 CAPSULE ORAL EVERY 6 HOURS
Qty: 8 CAPSULE | Refills: 0 | Status: SHIPPED | OUTPATIENT
Start: 2022-11-18 | End: 2022-11-20

## 2022-11-18 NOTE — PROGRESS NOTES
Doing well. No OB complaints but still having significant abdominal pain. Norco helps but using frequently. +FM. Recommended trying course of Indomethacin 25 mg every 6 hours. Also consultation with MFM for degenerating fibroid and the use of indomethacin in pregnancy. 1h GTT today. ABHIJEET 3 weeks. Dr. Kamlesh Mclaughlin MD    [facility] 10 OBGYN     This note was created by [facility] voice recognition. Errors in content may be related to improper recognition by the system; efforts to review and correct have been done but errors may still exist. Please be advised the primary purpose of this note is for me to communicate medical care. Standard sentence structure is not always used. Medical terminology and medical abbreviations may be used. There may be grammatical, typographical, and automated fill ins that may have errors missed in proofreading.

## 2022-11-18 NOTE — TELEPHONE ENCOUNTER
Patient would like to speak to [facility] about her upcoming appointment with Maternal Fetal Medicine it is not until 12/14/2022 and she would like to know if its okay due to her condition. Please follow up with patient.

## 2022-11-18 NOTE — TELEPHONE ENCOUNTER
RN spoke with Wolfgang Durán at [facility]. Wolfgang Durán says that they may have a sooner opening and that she will contact the pt today or Monday re: a possible earlier appt. RN spoke with pt and told her that Wolfgang Durán will be in touch today or Monday if they are able to get her an earlier appt. RN told pt to call the office if she hasn't heard back from [facility] by Tuesday. Pt verbalized understanding and agreed with POC.

## 2022-11-21 DIAGNOSIS — R73.09 ABNORMAL GTT (GLUCOSE TOLERANCE TEST): Primary | ICD-10-CM

## 2022-11-21 NOTE — PROGRESS NOTES
Called pt and provided JN's instructions. Verbal/written 3 hour provided, informed of hemoglobin electro. Pt asked questions, informed Ashlee Gallardo. Called pt informed, scheduled for 3 hour gtt and verbalized understanding.

## 2022-11-22 LAB
AFP SMOKING: NO
FAMILY HX NEURAL TUBE DEFECT: NO
INSULIN REQ MATERNAL DIABETES: NO
MATERNAL AGE OF DELIVERY: 30.1 YR
PATIENT'S AFP: 165 NG/ML

## 2022-11-28 ENCOUNTER — LABORATORY ENCOUNTER (OUTPATIENT)
Dept: LAB | Age: 27
End: 2022-11-28
Attending: OBSTETRICS & GYNECOLOGY
Payer: COMMERCIAL

## 2022-11-28 DIAGNOSIS — R73.09 ABNORMAL GLUCOSE TOLERANCE TEST: Primary | ICD-10-CM

## 2022-11-28 DIAGNOSIS — R73.09 ABNORMAL GTT (GLUCOSE TOLERANCE TEST): ICD-10-CM

## 2022-11-28 LAB
GLUCOSE 1H P GLC SERPL-MCNC: 189 MG/DL
GLUCOSE 2H P GLC SERPL-MCNC: 157 MG/DL
GLUCOSE 3H P GLC SERPL-MCNC: 99 MG/DL (ref 70–140)
GLUCOSE P FAST SERPL-MCNC: 84 MG/DL

## 2022-11-28 PROCEDURE — 85660 RBC SICKLE CELL TEST: CPT | Performed by: OBSTETRICS & GYNECOLOGY

## 2022-11-30 LAB
HGB A2 MFR BLD: 2.4 % (ref 1.5–3.5)
HGB PNL BLD ELPH: 58.9 % (ref 95.5–100)
HGB S BLD QL SOLY: POSITIVE
HGB S MFR BLD: 38.7 %

## 2022-12-01 PROBLEM — D57.3 SICKLE CELL TRAIT (HCC): Status: ACTIVE | Noted: 2022-12-01

## 2022-12-02 ENCOUNTER — HOSPITAL ENCOUNTER (OUTPATIENT)
Dept: PERINATAL CARE | Facility: HOSPITAL | Age: 27
Discharge: HOME | End: 2022-12-02
Attending: OBSTETRICS & GYNECOLOGY
Payer: COMMERCIAL

## 2022-12-02 VITALS
BODY MASS INDEX: 29 KG/M2 | WEIGHT: 156 LBS | HEART RATE: 96 BPM | SYSTOLIC BLOOD PRESSURE: 128 MMHG | DIASTOLIC BLOOD PRESSURE: 79 MMHG

## 2022-12-02 DIAGNOSIS — O26.899 ABDOMINAL PAIN AFFECTING PREGNANCY: ICD-10-CM

## 2022-12-02 DIAGNOSIS — O34.10 UTERINE FIBROID DURING PREGNANCY, ANTEPARTUM: ICD-10-CM

## 2022-12-02 DIAGNOSIS — O34.10 UTERINE FIBROID DURING PREGNANCY, ANTEPARTUM: Primary | ICD-10-CM

## 2022-12-02 DIAGNOSIS — R10.9 ABDOMINAL PAIN AFFECTING PREGNANCY: ICD-10-CM

## 2022-12-02 DIAGNOSIS — D25.9 UTERINE FIBROID DURING PREGNANCY, ANTEPARTUM: ICD-10-CM

## 2022-12-02 DIAGNOSIS — D25.9 UTERINE FIBROID DURING PREGNANCY, ANTEPARTUM: Primary | ICD-10-CM

## 2022-12-02 PROCEDURE — 76811 OB US DETAILED SNGL FETUS: CPT | Performed by: OBSTETRICS & GYNECOLOGY

## 2022-12-02 NOTE — PROGRESS NOTES
.Pt for level 2 US  HX neg nipt  Denies pregnancy complaints  States active fetus    Last HYdrocodone  11/29

## 2022-12-05 ENCOUNTER — TELEPHONE (OUTPATIENT)
Dept: OBGYN CLINIC | Facility: CLINIC | Age: 27
End: 2022-12-05

## 2022-12-05 NOTE — TELEPHONE ENCOUNTER
Patient is calling requesting to speak to [facility] about her Ultra sound on 12/02 she would like a follow up and speak about the note of that appointment. Patient also stated she like clearance to go back to work due to she travels for work. Please follow up with patient.

## 2022-12-06 ENCOUNTER — PATIENT MESSAGE (OUTPATIENT)
Dept: OBGYN CLINIC | Facility: CLINIC | Age: 27
End: 2022-12-06

## 2022-12-06 NOTE — TELEPHONE ENCOUNTER
From: Vanessa Davis  To: Reggie Reyes MD  Sent: 12/6/2022 10:38 AM CST  Subject: Doctor's Note     Hello! I am following up on a request I put in with Funmilayo Rangel yesterday about a doctor's note for work. I need the letter to release me to travel for work as soon as possible. Thanks!

## 2022-12-07 NOTE — TELEPHONE ENCOUNTER
Julisa Brown MD  You 12 hours ago (7:53 PM)     Yashira Wade to travel for work until 36 weeks gestation unless any medical condition changes. Sincerely,     Dr. Ivy Luther       Reviewed chart and letter was submitted. This encounter closed.

## 2022-12-14 ENCOUNTER — ROUTINE PRENATAL (OUTPATIENT)
Dept: OBGYN CLINIC | Facility: CLINIC | Age: 27
End: 2022-12-14
Payer: COMMERCIAL

## 2022-12-14 VITALS
SYSTOLIC BLOOD PRESSURE: 120 MMHG | WEIGHT: 159.13 LBS | DIASTOLIC BLOOD PRESSURE: 74 MMHG | BODY MASS INDEX: 29.28 KG/M2 | HEIGHT: 62 IN

## 2022-12-14 DIAGNOSIS — Z34.82 ENCOUNTER FOR SUPERVISION OF OTHER NORMAL PREGNANCY IN SECOND TRIMESTER: Primary | ICD-10-CM

## 2022-12-14 DIAGNOSIS — R73.09 ABNORMAL GLUCOSE TOLERANCE TEST: ICD-10-CM

## 2022-12-14 LAB
GLUCOSE (URINE DIPSTICK): NEGATIVE MG/DL
MULTISTIX LOT#: NORMAL NUMERIC
PROTEIN (URINE DIPSTICK): NEGATIVE MG/DL

## 2022-12-14 PROCEDURE — 3074F SYST BP LT 130 MM HG: CPT | Performed by: OBSTETRICS & GYNECOLOGY

## 2022-12-14 PROCEDURE — 3008F BODY MASS INDEX DOCD: CPT | Performed by: OBSTETRICS & GYNECOLOGY

## 2022-12-14 PROCEDURE — 90715 TDAP VACCINE 7 YRS/> IM: CPT | Performed by: OBSTETRICS & GYNECOLOGY

## 2022-12-14 PROCEDURE — 3078F DIAST BP <80 MM HG: CPT | Performed by: OBSTETRICS & GYNECOLOGY

## 2022-12-14 PROCEDURE — 90471 IMMUNIZATION ADMIN: CPT | Performed by: OBSTETRICS & GYNECOLOGY

## 2022-12-14 PROCEDURE — 81002 URINALYSIS NONAUTO W/O SCOPE: CPT | Performed by: OBSTETRICS & GYNECOLOGY

## 2022-12-14 NOTE — PROGRESS NOTES
Tdap Vaccine administered in Rt arm IM. Patient is 29w2d wks pregnant. Patient tolerated injection well no reaction at this time. 2 patient identifiers verified with pt. Ordering Dr. Loc Woody.

## 2022-12-14 NOTE — PROGRESS NOTES
Doing well. No OB complaints. +FM. Reviewed elevated 3h GTT and ordered diabetic RN education. Pt to schedule appointment ASAP. ABHIJEET 2 weeks with glucose log. Dr. Bud Carter MD    [facility] 10 OBGYN     This note was created by [facility] voice recognition. Errors in content may be related to improper recognition by the system; efforts to review and correct have been done but errors may still exist. Please be advised the primary purpose of this note is for me to communicate medical care. Standard sentence structure is not always used. Medical terminology and medical abbreviations may be used. There may be grammatical, typographical, and automated fill ins that may have errors missed in proofreading.

## 2022-12-15 ENCOUNTER — TELEPHONE (OUTPATIENT)
Dept: ENDOCRINOLOGY | Facility: HOSPITAL | Age: 27
End: 2022-12-15

## 2022-12-15 DIAGNOSIS — O24.419 GESTATIONAL DIABETES MELLITUS (GDM) IN THIRD TRIMESTER, GESTATIONAL DIABETES METHOD OF CONTROL UNSPECIFIED: Primary | ICD-10-CM

## 2022-12-15 NOTE — TELEPHONE ENCOUNTER
Your patient Leslie Bennett (1/6/1995) has an appointment scheduled for Gestational Diabetes education on 12/16/2022. Due to insurance purposes, I am requesting a new order with diagnosis of Gestational Diabetes, diet controlled, third trimester. She has requested individual appointment. If you are in agreement with this plan, please sign the pended order within Epic. Thank you for your assistance. Please call with any questions.     Phani Agustin RN BSN 2 [facility]  [phone]

## 2022-12-16 ENCOUNTER — HOSPITAL ENCOUNTER (OUTPATIENT)
Dept: ENDOCRINOLOGY | Facility: HOSPITAL | Age: 27
Discharge: HOME | End: 2022-12-16
Attending: OBSTETRICS & GYNECOLOGY
Payer: COMMERCIAL

## 2022-12-16 VITALS — WEIGHT: 158.81 LBS | BODY MASS INDEX: 29 KG/M2

## 2022-12-16 DIAGNOSIS — O24.419 GESTATIONAL DIABETES MELLITUS (GDM) IN THIRD TRIMESTER, GESTATIONAL DIABETES METHOD OF CONTROL UNSPECIFIED: Primary | ICD-10-CM

## 2022-12-16 RX ORDER — BLOOD-GLUCOSE METER
1 EACH MISCELLANEOUS DAILY
Qty: 1 KIT | Refills: 0 | Status: SHIPPED | OUTPATIENT
Start: 2022-12-16 | End: 2023-02-28

## 2022-12-16 RX ORDER — LANCETS
1 EACH MISCELLANEOUS 4 TIMES DAILY
Qty: 200 EACH | Refills: 3 | Status: SHIPPED | OUTPATIENT
Start: 2022-12-16

## 2022-12-16 RX ORDER — PERPHENAZINE 16 MG/1
1 TABLET, FILM COATED ORAL 4 TIMES DAILY
Qty: 120 STRIP | Refills: 3 | Status: SHIPPED | OUTPATIENT
Start: 2022-12-16 | End: 2023-02-28

## 2022-12-16 NOTE — TELEPHONE ENCOUNTER
Shanti Vail MD  [facility] Clinical Staff 5 minutes ago (9:09 AM)     Please order if needed.      Dr. Sierra Figures

## 2022-12-16 NOTE — PROGRESS NOTES
Muna Morgan  : 1995 was seen for Gestational Diabetes Counseling: Individual    Date: 2022   Start time: 8:05 am End time: 9:20 am    Obtained usual diet history:   Was drinking flavored coffee with espresso, has family history of diabetes, eats when hungry, not eat consistently-2-3 meals per day with small snacks. When working sometimes forget to consume food. Reports she has experienced hypoglycemia symptoms since high school and consumes snacks frequently during the day. B: starbucks, egg bites or sandwich  L: varies-resturant foods, burgers, salads  D: lighter meals, soup or small serving of chili, pasta  Snack: sweets-ice cream or chocolates, cookies or chips  Drinking: juice and water and tea (sweetened)  Education:     GDM Overview:  Reviewed gestational diabetes as diagnosis including target blood glucose values. Benefits, risks, and management options for improving/maintaining glucose control to mother/baby discussed. Instructed /demonstrated ability to perform blood glucose testing on: Contour Next One  Discussed monitoring ketones. Healthy Eating:  Discussed nutrition concepts for pregnancy/healthy eating and effects of food on BG value. Timing of meals; what is a carbohydrate, protein, fat. Taught: Carb counting, label reading, meal planning. Suggested Calorie Level: 2000    Being Active:  Benefits and effects of activity on BG discussed. Monitoring:  Instructed on how to use glucose monitor/proper lancet disposal. Testing schedules are:   Fastin-95 mg/dL, Call MD if >95 mg/dL twice in 1 week   2 Hour Post Prandial:  120 md/dL, Call MD if >120 mg/dL twice in 1 week. Taking Medication:  Reviewed when medication might be indicated. Reducing Risk:  Effects of elevated blood glucose on mother/baby reviewed. Post pregnancy management/prevention of Type 2 DM, and increased risk of having diabetes later in life reviewed.     Healthy Coping:  Family involvement/social support encouraged. Identification of lifestyle behaviors willing to change discussed. Training Tools Provided:  Printed materials covering each topic provided. Support Plan provided and reviewed. Patient Chosen Goals:      1. Follow recommended GDM meal plan. 2. Begin testing fasting glucose and 2 hours after meals   3. Bring glucose log to each MD office visit. 4. Encouraged activity if no restrictions. 5. Encouraged Carli Pickett to call diabetes center with any questions or concerns. Patient verbalized understanding and has no further questions at this time.     Mundo Casiano RN

## 2022-12-31 ENCOUNTER — HOSPITAL ENCOUNTER (OUTPATIENT)
Dept: ENDOCRINOLOGY | Facility: HOSPITAL | Age: 27
Discharge: HOME | End: 2022-12-31
Attending: OBSTETRICS & GYNECOLOGY
Payer: COMMERCIAL

## 2022-12-31 VITALS — WEIGHT: 163.13 LBS | BODY MASS INDEX: 30 KG/M2

## 2022-12-31 DIAGNOSIS — O24.419 GESTATIONAL DIABETES MELLITUS (GDM) IN THIRD TRIMESTER, GESTATIONAL DIABETES METHOD OF CONTROL UNSPECIFIED: Primary | ICD-10-CM

## 2023-01-03 ENCOUNTER — ROUTINE PRENATAL (OUTPATIENT)
Dept: OBGYN CLINIC | Facility: CLINIC | Age: 28
End: 2023-01-03
Payer: COMMERCIAL

## 2023-01-03 VITALS
HEIGHT: 62 IN | WEIGHT: 165 LBS | DIASTOLIC BLOOD PRESSURE: 70 MMHG | SYSTOLIC BLOOD PRESSURE: 120 MMHG | BODY MASS INDEX: 30.36 KG/M2

## 2023-01-03 DIAGNOSIS — O24.410 DIET CONTROLLED GESTATIONAL DIABETES MELLITUS (GDM) IN THIRD TRIMESTER: Primary | ICD-10-CM

## 2023-01-03 PROBLEM — Z34.90 SUPERVISION OF NORMAL PREGNANCY (HCC): Chronic | Status: ACTIVE | Noted: 2022-09-06

## 2023-01-03 PROBLEM — R73.09 ABNORMAL GLUCOSE TOLERANCE TEST: Status: RESOLVED | Noted: 2022-12-14 | Resolved: 2023-01-03

## 2023-01-03 PROBLEM — Z34.90 SUPERVISION OF NORMAL PREGNANCY: Chronic | Status: ACTIVE | Noted: 2022-09-06

## 2023-01-03 PROCEDURE — 3074F SYST BP LT 130 MM HG: CPT | Performed by: OBSTETRICS & GYNECOLOGY

## 2023-01-03 PROCEDURE — 90686 IIV4 VACC NO PRSV 0.5 ML IM: CPT | Performed by: OBSTETRICS & GYNECOLOGY

## 2023-01-03 PROCEDURE — 3008F BODY MASS INDEX DOCD: CPT | Performed by: OBSTETRICS & GYNECOLOGY

## 2023-01-03 PROCEDURE — 3078F DIAST BP <80 MM HG: CPT | Performed by: OBSTETRICS & GYNECOLOGY

## 2023-01-03 PROCEDURE — 90471 IMMUNIZATION ADMIN: CPT | Performed by: OBSTETRICS & GYNECOLOGY

## 2023-01-16 ENCOUNTER — ROUTINE PRENATAL (OUTPATIENT)
Dept: OBGYN CLINIC | Facility: CLINIC | Age: 28
End: 2023-01-16
Payer: COMMERCIAL

## 2023-01-16 VITALS
DIASTOLIC BLOOD PRESSURE: 70 MMHG | WEIGHT: 164 LBS | SYSTOLIC BLOOD PRESSURE: 120 MMHG | HEIGHT: 62 IN | BODY MASS INDEX: 30.18 KG/M2

## 2023-01-16 DIAGNOSIS — O24.410 DIET CONTROLLED GESTATIONAL DIABETES MELLITUS (GDM) IN THIRD TRIMESTER: ICD-10-CM

## 2023-01-16 DIAGNOSIS — Z34.03 ENCOUNTER FOR SUPERVISION OF NORMAL FIRST PREGNANCY IN THIRD TRIMESTER: Primary | ICD-10-CM

## 2023-01-16 PROCEDURE — 3078F DIAST BP <80 MM HG: CPT | Performed by: OBSTETRICS & GYNECOLOGY

## 2023-01-16 PROCEDURE — 3074F SYST BP LT 130 MM HG: CPT | Performed by: OBSTETRICS & GYNECOLOGY

## 2023-01-16 PROCEDURE — 3008F BODY MASS INDEX DOCD: CPT | Performed by: OBSTETRICS & GYNECOLOGY

## 2023-01-16 NOTE — PROGRESS NOTES
25year old  at 34w0d     Contractions: Yes - random / irregular - elena wu  VB: No  LOF: No  Fetal movement: Yes    Glucose from memory:  Fasting 80-85  Bf   Lunch 115-120 (two times slightly higher - knows she ate differently)  Dinner: 115-120 (up to 125 oncce)      Return OB  Pre- Care: UTD.   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       Pregnancy     Pyelonephritis affecting pregnancy in second trimester     Abdominal pain affecting pregnancy     Uterine fibroid during pregnancy, antepartum     Sickle cell trait ([address]     Diet controlled gestational diabetes mellitus (GDM) in third trimester     Breech presentation      - Return to clinic in: 2 weeks    Ho López DO

## 2023-02-01 ENCOUNTER — ROUTINE PRENATAL (OUTPATIENT)
Dept: OBGYN CLINIC | Facility: CLINIC | Age: 28
End: 2023-02-01
Payer: COMMERCIAL

## 2023-02-01 VITALS
WEIGHT: 169 LBS | BODY MASS INDEX: 31.1 KG/M2 | HEIGHT: 62 IN | DIASTOLIC BLOOD PRESSURE: 74 MMHG | SYSTOLIC BLOOD PRESSURE: 120 MMHG

## 2023-02-01 DIAGNOSIS — O24.410 DIET CONTROLLED GESTATIONAL DIABETES MELLITUS (GDM) IN THIRD TRIMESTER: ICD-10-CM

## 2023-02-01 DIAGNOSIS — Z34.03 ENCOUNTER FOR SUPERVISION OF NORMAL FIRST PREGNANCY IN THIRD TRIMESTER: Primary | ICD-10-CM

## 2023-02-01 PROCEDURE — 3074F SYST BP LT 130 MM HG: CPT | Performed by: OBSTETRICS & GYNECOLOGY

## 2023-02-01 PROCEDURE — 3078F DIAST BP <80 MM HG: CPT | Performed by: OBSTETRICS & GYNECOLOGY

## 2023-02-01 PROCEDURE — 3008F BODY MASS INDEX DOCD: CPT | Performed by: OBSTETRICS & GYNECOLOGY

## 2023-02-01 PROCEDURE — 87653 STREP B DNA AMP PROBE: CPT | Performed by: OBSTETRICS & GYNECOLOGY

## 2023-02-01 PROCEDURE — 87081 CULTURE SCREEN ONLY: CPT | Performed by: OBSTETRICS & GYNECOLOGY

## 2023-02-01 NOTE — PROGRESS NOTES
25year old  at 36w2d     Contractions: Yes - random / irregular - elena wu  VB: No  LOF: No  Fetal movement: Yes    Glucose from memory:   Glucose from memory:  Fastin-85  Bf:   Lunch: 160 after eating at a restaurant - usually 111-119  Dinner:       Return OB  Pre-Ismael Care: UTD.   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       Pregnancy     Pyelonephritis affecting pregnancy in second trimester     Abdominal pain affecting pregnancy     Uterine fibroid during pregnancy, antepartum     Sickle cell trait ([facility] [address]     Diet controlled gestational diabetes mellitus (GDM) in third trimester     Breech presentation      - Return to clinic in: 1 weeks    Rufus Pina DO

## 2023-02-01 NOTE — PROGRESS NOTES
Glucose from memory:  Fastin-85  Bf:   Lunch: 160 after eating at a restaurant - usually 111-119  Dinner:

## 2023-02-03 LAB — GROUP B STREP BY PCR FOR PCR OVT: NEGATIVE

## 2023-02-04 ENCOUNTER — PATIENT MESSAGE (OUTPATIENT)
Dept: OBGYN CLINIC | Facility: CLINIC | Age: 28
End: 2023-02-04

## 2023-02-06 NOTE — TELEPHONE ENCOUNTER
Called pt and paperwork is for [place]. Rubina Thorne,     I faxed the FMLA paper and left your copy at the . From: Ade Gomez  Sent: 2/6/2023  9:20 AM CST  To: [facility] Clinical Staff  Subject: Leave of Absense Form    Goodmorning! I would say no restrictions are needed right now. Of course, that depends on my delivery, but I'm sure we can change it if need be.

## 2023-02-08 ENCOUNTER — LAB ENCOUNTER (OUTPATIENT)
Dept: LAB | Age: 28
End: 2023-02-08
Attending: OBSTETRICS & GYNECOLOGY
Payer: COMMERCIAL

## 2023-02-08 ENCOUNTER — ROUTINE PRENATAL (OUTPATIENT)
Dept: OBGYN CLINIC | Facility: CLINIC | Age: 28
End: 2023-02-08
Payer: COMMERCIAL

## 2023-02-08 VITALS
BODY MASS INDEX: 31.28 KG/M2 | SYSTOLIC BLOOD PRESSURE: 120 MMHG | DIASTOLIC BLOOD PRESSURE: 70 MMHG | HEIGHT: 62 IN | WEIGHT: 170 LBS

## 2023-02-08 DIAGNOSIS — Z34.03 ENCOUNTER FOR SUPERVISION OF NORMAL FIRST PREGNANCY IN THIRD TRIMESTER: Primary | ICD-10-CM

## 2023-02-08 DIAGNOSIS — O24.410 DIET CONTROLLED GESTATIONAL DIABETES MELLITUS (GDM) IN THIRD TRIMESTER: ICD-10-CM

## 2023-02-08 DIAGNOSIS — Z34.03 ENCOUNTER FOR SUPERVISION OF NORMAL FIRST PREGNANCY IN THIRD TRIMESTER: ICD-10-CM

## 2023-02-08 LAB
DEPRECATED RDW RBC AUTO: 41.2 FL (ref 35.1–46.3)
ERYTHROCYTE [DISTWIDTH] IN BLOOD BY AUTOMATED COUNT: 16.6 % (ref 11–15)
HCT VFR BLD AUTO: 32 %
HGB BLD-MCNC: 10 G/DL
MCH RBC QN AUTO: 21.6 PG (ref 26–34)
MCHC RBC AUTO-ENTMCNC: 31.3 G/DL (ref 31–37)
MCV RBC AUTO: 69.3 FL
PLATELET # BLD AUTO: 320 10(3)UL (ref 150–450)
RBC # BLD AUTO: 4.62 X10(6)UL
WBC # BLD AUTO: 10.7 X10(3) UL (ref 4–11)

## 2023-02-08 PROCEDURE — 86780 TREPONEMA PALLIDUM: CPT

## 2023-02-08 PROCEDURE — 3008F BODY MASS INDEX DOCD: CPT | Performed by: OBSTETRICS & GYNECOLOGY

## 2023-02-08 PROCEDURE — 85027 COMPLETE CBC AUTOMATED: CPT

## 2023-02-08 PROCEDURE — 87389 HIV-1 AG W/HIV-1&-2 AB AG IA: CPT

## 2023-02-08 PROCEDURE — 3074F SYST BP LT 130 MM HG: CPT | Performed by: OBSTETRICS & GYNECOLOGY

## 2023-02-08 PROCEDURE — 85060 BLOOD SMEAR INTERPRETATION: CPT

## 2023-02-08 PROCEDURE — 3078F DIAST BP <80 MM HG: CPT | Performed by: OBSTETRICS & GYNECOLOGY

## 2023-02-08 PROCEDURE — 36415 COLL VENOUS BLD VENIPUNCTURE: CPT

## 2023-02-08 NOTE — PROGRESS NOTES
25year old  at 37w2d     Contractions: Yes - random / irregular - elena wu  VB: No  LOF: No  Fetal movement: Yes    Glucose log  fastin-84  Bf  (the had pop-tarts and cereal for this high one), total of 3>120 in 1 week  Lunch: 108-152 (fas food for the high one), total of 2>120 in 1 week  Dinner:   Total of 2>120 in 1 week        Return OB  Pre-Ismael Care: UTD.   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       Pregnancy     Pyelonephritis affecting pregnancy in second trimester     Abdominal pain affecting pregnancy     Uterine fibroid during pregnancy, antepartum     Sickle cell trait ([address]     Diet controlled gestational diabetes mellitus (GDM) in third trimester     Breech presentation      - Return to clinic in: 1 weeks    Emilio Joseph DO

## 2023-02-10 LAB — T PALLIDUM AB SER QL: NEGATIVE

## 2023-02-15 ENCOUNTER — ROUTINE PRENATAL (OUTPATIENT)
Dept: OBGYN CLINIC | Facility: CLINIC | Age: 28
End: 2023-02-15
Payer: COMMERCIAL

## 2023-02-15 VITALS
BODY MASS INDEX: 31.77 KG/M2 | WEIGHT: 172.63 LBS | DIASTOLIC BLOOD PRESSURE: 74 MMHG | SYSTOLIC BLOOD PRESSURE: 128 MMHG | HEIGHT: 62 IN

## 2023-02-15 DIAGNOSIS — Z36.9 UNSPECIFIED ANTENATAL SCREENING: Primary | ICD-10-CM

## 2023-02-15 LAB
GLUCOSE (URINE DIPSTICK): NEGATIVE MG/DL
MULTISTIX LOT#: NORMAL NUMERIC

## 2023-02-15 PROCEDURE — 3074F SYST BP LT 130 MM HG: CPT | Performed by: OBSTETRICS & GYNECOLOGY

## 2023-02-15 PROCEDURE — 3008F BODY MASS INDEX DOCD: CPT | Performed by: OBSTETRICS & GYNECOLOGY

## 2023-02-15 PROCEDURE — 3078F DIAST BP <80 MM HG: CPT | Performed by: OBSTETRICS & GYNECOLOGY

## 2023-02-15 PROCEDURE — 81002 URINALYSIS NONAUTO W/O SCOPE: CPT | Performed by: OBSTETRICS & GYNECOLOGY

## 2023-02-15 NOTE — PROGRESS NOTES
Doing well. No OB complaints. +FM. Some irregular contractions. SVE next week. ABHIJEET 1 weeks. Dr. Fransisca Waller MD    [facility] OBGYN     This note was created by [facility] voice recognition. Errors in content may be related to improper recognition by the system; efforts to review and correct have been done but errors may still exist. Please be advised the primary purpose of this note is for me to communicate medical care. Standard sentence structure is not always used. Medical terminology and medical abbreviations may be used. There may be grammatical, typographical, and automated fill ins that may have errors missed in proofreading.

## 2023-02-22 ENCOUNTER — TELEPHONE (OUTPATIENT)
Dept: OBGYN CLINIC | Facility: CLINIC | Age: 28
End: 2023-02-22

## 2023-02-22 ENCOUNTER — ROUTINE PRENATAL (OUTPATIENT)
Dept: OBGYN CLINIC | Facility: CLINIC | Age: 28
End: 2023-02-22
Payer: COMMERCIAL

## 2023-02-22 VITALS
SYSTOLIC BLOOD PRESSURE: 118 MMHG | WEIGHT: 170.88 LBS | HEIGHT: 62 IN | BODY MASS INDEX: 31.45 KG/M2 | DIASTOLIC BLOOD PRESSURE: 80 MMHG

## 2023-02-22 DIAGNOSIS — O24.410 DIET CONTROLLED GESTATIONAL DIABETES MELLITUS (GDM) IN THIRD TRIMESTER: ICD-10-CM

## 2023-02-22 DIAGNOSIS — Z34.03 ENCOUNTER FOR SUPERVISION OF NORMAL FIRST PREGNANCY IN THIRD TRIMESTER: Primary | ICD-10-CM

## 2023-02-22 DIAGNOSIS — U07.1 COVID: Primary | ICD-10-CM

## 2023-02-22 PROCEDURE — 3008F BODY MASS INDEX DOCD: CPT | Performed by: OBSTETRICS & GYNECOLOGY

## 2023-02-22 PROCEDURE — 3079F DIAST BP 80-89 MM HG: CPT | Performed by: OBSTETRICS & GYNECOLOGY

## 2023-02-22 PROCEDURE — 3074F SYST BP LT 130 MM HG: CPT | Performed by: OBSTETRICS & GYNECOLOGY

## 2023-02-22 PROCEDURE — 81002 URINALYSIS NONAUTO W/O SCOPE: CPT | Performed by: OBSTETRICS & GYNECOLOGY

## 2023-02-22 NOTE — PROGRESS NOTES
25year old  at 39w2d     Contractions: Yes - random / irregular - elena wu  VB: No  LOF: No  Fetal movement: Yes    Glucose: log over past 10 days  Fasting 2>95  Bf 4>120   Lunch 4>120 (pancakes for brunch one day 173 - this is the highest all week)  Dinner: 4>120      Cervix: 1.5/50/-3, soft, mid    Return OB  Pre-Ismael Care: UTD.   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       Pregnancy     Pyelonephritis affecting pregnancy in second trimester     Abdominal pain affecting pregnancy     Uterine fibroid during pregnancy, antepartum     Sickle cell trait (HCC)     Diet controlled gestational diabetes mellitus (GDM) in third trimester     Breech presentation      Ho López DO

## 2023-02-22 NOTE — TELEPHONE ENCOUNTER
Pt scheduled for IOL 3/1 at 9 pm. Spoke to Yamel in [facility].     ----- Message from Donna Luciano DO sent at 2/22/2023 11:36 AM CST -----  Regarding: induction of labor  Hi,  Please sched induction of labor for GDMA1 for Do Alvarez on 3/1/23, 8 pm cytotec. Thank you!   ~RD

## 2023-02-26 ENCOUNTER — LAB ENCOUNTER (OUTPATIENT)
Dept: LAB | Facility: HOSPITAL | Age: 28
End: 2023-02-26
Attending: OBSTETRICS & GYNECOLOGY
Payer: COMMERCIAL

## 2023-02-26 DIAGNOSIS — U07.1 COVID: ICD-10-CM

## 2023-02-26 LAB — SARS-COV-2 RNA RESP QL NAA+PROBE: NOT DETECTED

## 2023-03-01 ENCOUNTER — HOSPITAL ENCOUNTER (INPATIENT)
Facility: HOSPITAL | Age: 28
LOS: 3 days | Discharge: HOME | End: 2023-03-04
Attending: OBSTETRICS & GYNECOLOGY | Admitting: OBSTETRICS & GYNECOLOGY
Payer: COMMERCIAL

## 2023-03-01 ENCOUNTER — APPOINTMENT (OUTPATIENT)
Dept: OBGYN CLINIC | Facility: HOSPITAL | Age: 28
End: 2023-03-01
Payer: COMMERCIAL

## 2023-03-01 ENCOUNTER — TELEPHONE (OUTPATIENT)
Dept: OBGYN UNIT | Facility: HOSPITAL | Age: 28
End: 2023-03-01

## 2023-03-01 PROBLEM — Z34.90 ENCOUNTER FOR INDUCTION OF LABOR: Status: ACTIVE | Noted: 2023-03-01

## 2023-03-01 PROBLEM — Z34.90 ENCOUNTER FOR INDUCTION OF LABOR (HCC): Status: ACTIVE | Noted: 2023-03-01

## 2023-03-01 LAB
ANTIBODY SCREEN: NEGATIVE
BASOPHILS # BLD: 0 X10(3) UL (ref 0–0.2)
BASOPHILS NFR BLD: 0 %
DEPRECATED RDW RBC AUTO: 41.1 FL (ref 35.1–46.3)
EOSINOPHIL # BLD: 0.9 X10(3) UL (ref 0–0.7)
EOSINOPHIL NFR BLD: 7 %
ERYTHROCYTE [DISTWIDTH] IN BLOOD BY AUTOMATED COUNT: 17.3 % (ref 11–15)
GLUCOSE BLDC GLUCOMTR-MCNC: 85 MG/DL (ref 70–99)
HCT VFR BLD AUTO: 32.7 %
HGB BLD-MCNC: 10.2 G/DL
LYMPHOCYTES NFR BLD: 31 %
LYMPHOCYTES NFR BLD: 4.48 X10(3) UL (ref 1–4)
MCH RBC QN AUTO: 21.1 PG (ref 26–34)
MCHC RBC AUTO-ENTMCNC: 31.2 G/DL (ref 31–37)
MCV RBC AUTO: 67.6 FL
MONOCYTES # BLD: 0.77 X10(3) UL (ref 0.1–1)
MONOCYTES NFR BLD: 6 %
NEUTROPHILS # BLD AUTO: 7.16 X10 (3) UL (ref 1.5–7.7)
NEUTROPHILS NFR BLD: 52 %
NEUTS HYPERSEG # BLD: 6.66 X10(3) UL (ref 1.5–7.7)
PLATELET # BLD AUTO: 331 10(3)UL (ref 150–450)
RBC # BLD AUTO: 4.84 X10(6)UL
RH BLOOD TYPE: POSITIVE
TOTAL CELLS COUNTED BLD: 100
VARIANT LYMPHS NFR BLD MANUAL: 4 %
WBC # BLD AUTO: 12.8 X10(3) UL (ref 4–11)

## 2023-03-01 PROCEDURE — 3E0DXGC INTRODUCTION OF OTHER THERAPEUTIC SUBSTANCE INTO MOUTH AND PHARYNX, EXTERNAL APPROACH: ICD-10-PCS | Performed by: OBSTETRICS & GYNECOLOGY

## 2023-03-01 PROCEDURE — 10H07YZ INSERTION OF OTHER DEVICE INTO PRODUCTS OF CONCEPTION, VIA NATURAL OR ARTIFICIAL OPENING: ICD-10-PCS | Performed by: OBSTETRICS & GYNECOLOGY

## 2023-03-01 RX ORDER — ONDANSETRON 2 MG/ML
4 INJECTION INTRAMUSCULAR; INTRAVENOUS EVERY 6 HOURS PRN
Status: DISCONTINUED | OUTPATIENT
Start: 2023-03-01 | End: 2023-03-02

## 2023-03-01 RX ORDER — ACETAMINOPHEN 500 MG
500 TABLET ORAL EVERY 6 HOURS PRN
Status: DISCONTINUED | OUTPATIENT
Start: 2023-03-01 | End: 2023-03-02 | Stop reason: HOSPADM

## 2023-03-01 RX ORDER — HYDROXYZINE HYDROCHLORIDE 50 MG/ML
50 INJECTION, SOLUTION INTRAMUSCULAR EVERY 4 HOURS PRN
Status: DISCONTINUED | OUTPATIENT
Start: 2023-03-01 | End: 2023-03-04

## 2023-03-01 RX ORDER — SODIUM CHLORIDE, SODIUM LACTATE, POTASSIUM CHLORIDE, CALCIUM CHLORIDE 600; 310; 30; 20 MG/100ML; MG/100ML; MG/100ML; MG/100ML
INJECTION, SOLUTION INTRAVENOUS AS NEEDED
Status: DISCONTINUED | OUTPATIENT
Start: 2023-03-01 | End: 2023-03-02 | Stop reason: HOSPADM

## 2023-03-01 RX ORDER — TERBUTALINE SULFATE 1 MG/ML
0.25 INJECTION, SOLUTION SUBCUTANEOUS AS NEEDED
Status: DISCONTINUED | OUTPATIENT
Start: 2023-03-01 | End: 2023-03-02 | Stop reason: HOSPADM

## 2023-03-01 RX ORDER — LIDOCAINE HYDROCHLORIDE 10 MG/ML
30 INJECTION, SOLUTION EPIDURAL; INFILTRATION; INTRACAUDAL; PERINEURAL ONCE
Status: DISCONTINUED | OUTPATIENT
Start: 2023-03-01 | End: 2023-03-02 | Stop reason: HOSPADM

## 2023-03-01 RX ORDER — TRISODIUM CITRATE DIHYDRATE AND CITRIC ACID MONOHYDRATE 500; 334 MG/5ML; MG/5ML
30 SOLUTION ORAL AS NEEDED
Status: COMPLETED | OUTPATIENT
Start: 2023-03-01 | End: 2023-03-02

## 2023-03-01 RX ORDER — CALCIUM CARBONATE 200(500)MG
1000 TABLET,CHEWABLE ORAL 3 TIMES DAILY PRN
Status: DISCONTINUED | OUTPATIENT
Start: 2023-03-01 | End: 2023-03-04

## 2023-03-01 RX ORDER — DEXTROSE, SODIUM CHLORIDE, SODIUM LACTATE, POTASSIUM CHLORIDE, AND CALCIUM CHLORIDE 5; .6; .31; .03; .02 G/100ML; G/100ML; G/100ML; G/100ML; G/100ML
INJECTION, SOLUTION INTRAVENOUS CONTINUOUS
Status: DISCONTINUED | OUTPATIENT
Start: 2023-03-01 | End: 2023-03-02 | Stop reason: HOSPADM

## 2023-03-01 RX ORDER — NALBUPHINE HCL 10 MG/ML
10 AMPUL (ML) INJECTION
Status: DISCONTINUED | OUTPATIENT
Start: 2023-03-01 | End: 2023-03-02 | Stop reason: HOSPADM

## 2023-03-01 RX ORDER — IBUPROFEN 600 MG/1
600 TABLET ORAL EVERY 6 HOURS PRN
Status: DISCONTINUED | OUTPATIENT
Start: 2023-03-01 | End: 2023-03-02

## 2023-03-01 RX ORDER — AMMONIA INHALANTS 0.04 G/.3ML
0.3 INHALANT RESPIRATORY (INHALATION) AS NEEDED
Status: DISCONTINUED | OUTPATIENT
Start: 2023-03-01 | End: 2023-03-02 | Stop reason: HOSPADM

## 2023-03-02 ENCOUNTER — ANESTHESIA EVENT (OUTPATIENT)
Dept: OBGYN UNIT | Facility: HOSPITAL | Age: 28
End: 2023-03-02
Payer: COMMERCIAL

## 2023-03-02 ENCOUNTER — ANESTHESIA (OUTPATIENT)
Dept: OBGYN UNIT | Facility: HOSPITAL | Age: 28
End: 2023-03-02
Payer: COMMERCIAL

## 2023-03-02 LAB
GLUCOSE BLDC GLUCOMTR-MCNC: 112 MG/DL (ref 70–99)
GLUCOSE BLDC GLUCOMTR-MCNC: 80 MG/DL (ref 70–99)
GLUCOSE BLDC GLUCOMTR-MCNC: 85 MG/DL (ref 70–99)

## 2023-03-02 PROCEDURE — 59514 CESAREAN DELIVERY ONLY: CPT | Performed by: OBSTETRICS & GYNECOLOGY

## 2023-03-02 PROCEDURE — 59510 CESAREAN DELIVERY: CPT | Performed by: OBSTETRICS & GYNECOLOGY

## 2023-03-02 RX ORDER — FAMOTIDINE 10 MG/ML
20 INJECTION, SOLUTION INTRAVENOUS ONCE
Status: COMPLETED | OUTPATIENT
Start: 2023-03-02 | End: 2023-03-02

## 2023-03-02 RX ORDER — AMMONIA INHALANTS 0.04 G/.3ML
0.3 INHALANT RESPIRATORY (INHALATION) AS NEEDED
Status: DISCONTINUED | OUTPATIENT
Start: 2023-03-02 | End: 2023-03-04

## 2023-03-02 RX ORDER — ONDANSETRON 2 MG/ML
4 INJECTION INTRAMUSCULAR; INTRAVENOUS EVERY 6 HOURS PRN
Status: DISCONTINUED | OUTPATIENT
Start: 2023-03-02 | End: 2023-03-04

## 2023-03-02 RX ORDER — SODIUM CHLORIDE, SODIUM LACTATE, POTASSIUM CHLORIDE, CALCIUM CHLORIDE 600; 310; 30; 20 MG/100ML; MG/100ML; MG/100ML; MG/100ML
INJECTION, SOLUTION INTRAVENOUS CONTINUOUS
Status: DISCONTINUED | OUTPATIENT
Start: 2023-03-02 | End: 2023-03-04

## 2023-03-02 RX ORDER — BUPIVACAINE HYDROCHLORIDE 2.5 MG/ML
INJECTION, SOLUTION EPIDURAL; INFILTRATION; INTRACAUDAL
Status: COMPLETED | OUTPATIENT
Start: 2023-03-02 | End: 2023-03-02

## 2023-03-02 RX ORDER — BUPIVACAINE HCL/0.9 % NACL/PF 0.25 %
5 PLASTIC BAG, INJECTION (ML) EPIDURAL AS NEEDED
Status: DISCONTINUED | OUTPATIENT
Start: 2023-03-02 | End: 2023-03-04

## 2023-03-02 RX ORDER — HYDROCODONE BITARTRATE AND ACETAMINOPHEN 7.5; 325 MG/1; MG/1
1 TABLET ORAL EVERY 6 HOURS PRN
Status: DISCONTINUED | OUTPATIENT
Start: 2023-03-02 | End: 2023-03-02

## 2023-03-02 RX ORDER — SODIUM CHLORIDE 9 MG/ML
INJECTION INTRAVENOUS
Status: DISPENSED
Start: 2023-03-02 | End: 2023-03-02

## 2023-03-02 RX ORDER — LIDOCAINE HYDROCHLORIDE 10 MG/ML
INJECTION, SOLUTION INFILTRATION; PERINEURAL
Status: COMPLETED | OUTPATIENT
Start: 2023-03-02 | End: 2023-03-02

## 2023-03-02 RX ORDER — KETOROLAC TROMETHAMINE 30 MG/ML
30 INJECTION, SOLUTION INTRAMUSCULAR; INTRAVENOUS EVERY 6 HOURS PRN
Status: DISPENSED | OUTPATIENT
Start: 2023-03-02 | End: 2023-03-04

## 2023-03-02 RX ORDER — LIDOCAINE HYDROCHLORIDE AND EPINEPHRINE 20; 5 MG/ML; UG/ML
INJECTION, SOLUTION EPIDURAL; INFILTRATION; INTRACAUDAL; PERINEURAL
Status: DISPENSED
Start: 2023-03-02 | End: 2023-03-02

## 2023-03-02 RX ORDER — KETOROLAC TROMETHAMINE 30 MG/ML
30 INJECTION, SOLUTION INTRAMUSCULAR; INTRAVENOUS ONCE AS NEEDED
Status: DISCONTINUED | OUTPATIENT
Start: 2023-03-02 | End: 2023-03-02

## 2023-03-02 RX ORDER — SIMETHICONE 80 MG
80 TABLET,CHEWABLE ORAL 3 TIMES DAILY PRN
Status: DISCONTINUED | OUTPATIENT
Start: 2023-03-02 | End: 2023-03-04

## 2023-03-02 RX ORDER — METOCLOPRAMIDE HYDROCHLORIDE 5 MG/ML
10 INJECTION INTRAMUSCULAR; INTRAVENOUS ONCE
Status: COMPLETED | OUTPATIENT
Start: 2023-03-02 | End: 2023-03-02

## 2023-03-02 RX ORDER — TRISODIUM CITRATE DIHYDRATE AND CITRIC ACID MONOHYDRATE 500; 334 MG/5ML; MG/5ML
SOLUTION ORAL
Status: DISPENSED
Start: 2023-03-02 | End: 2023-03-03

## 2023-03-02 RX ORDER — LIDOCAINE HYDROCHLORIDE AND EPINEPHRINE 20; 5 MG/ML; UG/ML
INJECTION, SOLUTION EPIDURAL; INFILTRATION; INTRACAUDAL; PERINEURAL AS NEEDED
Status: DISCONTINUED | OUTPATIENT
Start: 2023-03-02 | End: 2023-03-02 | Stop reason: SURG

## 2023-03-02 RX ORDER — HYDROMORPHONE HYDROCHLORIDE 1 MG/ML
0.4 INJECTION, SOLUTION INTRAMUSCULAR; INTRAVENOUS; SUBCUTANEOUS
Status: DISCONTINUED | OUTPATIENT
Start: 2023-03-02 | End: 2023-03-02

## 2023-03-02 RX ORDER — HYDROCODONE BITARTRATE AND ACETAMINOPHEN 7.5; 325 MG/1; MG/1
2 TABLET ORAL EVERY 6 HOURS PRN
Status: DISCONTINUED | OUTPATIENT
Start: 2023-03-02 | End: 2023-03-02

## 2023-03-02 RX ORDER — NALBUPHINE HCL 10 MG/ML
2.5 AMPUL (ML) INJECTION EVERY 4 HOURS PRN
Status: DISCONTINUED | OUTPATIENT
Start: 2023-03-02 | End: 2023-03-02

## 2023-03-02 RX ORDER — METOCLOPRAMIDE HYDROCHLORIDE 5 MG/ML
INJECTION INTRAMUSCULAR; INTRAVENOUS
Status: DISPENSED
Start: 2023-03-02 | End: 2023-03-03

## 2023-03-02 RX ORDER — TERBUTALINE SULFATE 1 MG/ML
INJECTION, SOLUTION SUBCUTANEOUS
Status: DISPENSED
Start: 2023-03-02 | End: 2023-03-03

## 2023-03-02 RX ORDER — BUPIVACAINE HYDROCHLORIDE 2.5 MG/ML
20 INJECTION, SOLUTION EPIDURAL; INFILTRATION; INTRACAUDAL ONCE
Status: DISCONTINUED | OUTPATIENT
Start: 2023-03-02 | End: 2023-03-02 | Stop reason: HOSPADM

## 2023-03-02 RX ORDER — HYDROMORPHONE HYDROCHLORIDE 1 MG/ML
0.6 INJECTION, SOLUTION INTRAMUSCULAR; INTRAVENOUS; SUBCUTANEOUS
Status: DISCONTINUED | OUTPATIENT
Start: 2023-03-02 | End: 2023-03-02

## 2023-03-02 RX ORDER — LIDOCAINE HYDROCHLORIDE AND EPINEPHRINE 15; 5 MG/ML; UG/ML
INJECTION, SOLUTION EPIDURAL
Status: COMPLETED | OUTPATIENT
Start: 2023-03-02 | End: 2023-03-02

## 2023-03-02 RX ORDER — ACETAMINOPHEN 500 MG
1000 TABLET ORAL EVERY 6 HOURS
Status: DISCONTINUED | OUTPATIENT
Start: 2023-03-02 | End: 2023-03-02

## 2023-03-02 RX ORDER — BUPIVACAINE HCL/0.9 % NACL/PF 0.25 %
PLASTIC BAG, INJECTION (ML) EPIDURAL
Status: DISCONTINUED
Start: 2023-03-02 | End: 2023-03-02 | Stop reason: WASHOUT

## 2023-03-02 RX ORDER — ACETAMINOPHEN 500 MG
1000 TABLET ORAL EVERY 6 HOURS
Status: DISCONTINUED | OUTPATIENT
Start: 2023-03-02 | End: 2023-03-04

## 2023-03-02 RX ORDER — DIPHENHYDRAMINE HCL 25 MG
25 CAPSULE ORAL EVERY 4 HOURS PRN
Status: DISCONTINUED | OUTPATIENT
Start: 2023-03-02 | End: 2023-03-02

## 2023-03-02 RX ORDER — DIPHENHYDRAMINE HYDROCHLORIDE 50 MG/ML
25 INJECTION INTRAMUSCULAR; INTRAVENOUS ONCE AS NEEDED
Status: ACTIVE | OUTPATIENT
Start: 2023-03-02 | End: 2023-03-02

## 2023-03-02 RX ORDER — DOCUSATE SODIUM 100 MG/1
100 CAPSULE, LIQUID FILLED ORAL
Status: DISCONTINUED | OUTPATIENT
Start: 2023-03-02 | End: 2023-03-02

## 2023-03-02 RX ORDER — ACETAMINOPHEN 325 MG/1
650 TABLET ORAL EVERY 6 HOURS PRN
Status: DISCONTINUED | OUTPATIENT
Start: 2023-03-02 | End: 2023-03-02

## 2023-03-02 RX ORDER — CEFAZOLIN SODIUM/WATER 2 G/20 ML
2 SYRINGE (ML) INTRAVENOUS ONCE
Status: COMPLETED | OUTPATIENT
Start: 2023-03-02 | End: 2023-03-02

## 2023-03-02 RX ORDER — PROCHLORPERAZINE EDISYLATE 5 MG/ML
5 INJECTION INTRAMUSCULAR; INTRAVENOUS ONCE AS NEEDED
Status: DISCONTINUED | OUTPATIENT
Start: 2023-03-02 | End: 2023-03-02

## 2023-03-02 RX ORDER — IBUPROFEN 600 MG/1
600 TABLET ORAL EVERY 6 HOURS
Status: DISCONTINUED | OUTPATIENT
Start: 2023-03-03 | End: 2023-03-04

## 2023-03-02 RX ORDER — MORPHINE SULFATE 1 MG/ML
INJECTION, SOLUTION EPIDURAL; INTRATHECAL; INTRAVENOUS AS NEEDED
Status: DISCONTINUED | OUTPATIENT
Start: 2023-03-02 | End: 2023-03-02 | Stop reason: SURG

## 2023-03-02 RX ORDER — NALBUPHINE HCL 10 MG/ML
2.5 AMPUL (ML) INJECTION
Status: DISCONTINUED | OUTPATIENT
Start: 2023-03-02 | End: 2023-03-04

## 2023-03-02 RX ORDER — BUPIVACAINE HYDROCHLORIDE 2.5 MG/ML
INJECTION, SOLUTION EPIDURAL; INFILTRATION; INTRACAUDAL
Status: DISPENSED
Start: 2023-03-02 | End: 2023-03-02

## 2023-03-02 RX ORDER — ONDANSETRON 2 MG/ML
INJECTION INTRAMUSCULAR; INTRAVENOUS AS NEEDED
Status: DISCONTINUED | OUTPATIENT
Start: 2023-03-02 | End: 2023-03-02 | Stop reason: SURG

## 2023-03-02 RX ORDER — DOCUSATE SODIUM 100 MG/1
100 CAPSULE, LIQUID FILLED ORAL
Status: DISCONTINUED | OUTPATIENT
Start: 2023-03-02 | End: 2023-03-04

## 2023-03-02 RX ORDER — FAMOTIDINE 10 MG/ML
INJECTION, SOLUTION INTRAVENOUS
Status: DISPENSED
Start: 2023-03-02 | End: 2023-03-03

## 2023-03-02 RX ORDER — DEXTROSE, SODIUM CHLORIDE, SODIUM LACTATE, POTASSIUM CHLORIDE, AND CALCIUM CHLORIDE 5; .6; .31; .03; .02 G/100ML; G/100ML; G/100ML; G/100ML; G/100ML
INJECTION, SOLUTION INTRAVENOUS CONTINUOUS
Status: DISCONTINUED | OUTPATIENT
Start: 2023-03-02 | End: 2023-03-04

## 2023-03-02 RX ORDER — GABAPENTIN 300 MG/1
300 CAPSULE ORAL EVERY 8 HOURS PRN
Status: DISCONTINUED | OUTPATIENT
Start: 2023-03-02 | End: 2023-03-03

## 2023-03-02 RX ORDER — ONDANSETRON 2 MG/ML
4 INJECTION INTRAMUSCULAR; INTRAVENOUS ONCE AS NEEDED
Status: ACTIVE | OUTPATIENT
Start: 2023-03-02 | End: 2023-03-02

## 2023-03-02 RX ORDER — HALOPERIDOL 5 MG/ML
0.5 INJECTION INTRAMUSCULAR ONCE AS NEEDED
Status: DISCONTINUED | OUTPATIENT
Start: 2023-03-02 | End: 2023-03-02

## 2023-03-02 RX ORDER — ONDANSETRON 2 MG/ML
4 INJECTION INTRAMUSCULAR; INTRAVENOUS ONCE AS NEEDED
Status: DISCONTINUED | OUTPATIENT
Start: 2023-03-02 | End: 2023-03-02

## 2023-03-02 RX ORDER — ACETAMINOPHEN 500 MG
TABLET ORAL
Status: COMPLETED
Start: 2023-03-02 | End: 2023-03-03

## 2023-03-02 RX ORDER — AMMONIA INHALANTS 0.04 G/.3ML
0.3 INHALANT RESPIRATORY (INHALATION) AS NEEDED
Status: DISCONTINUED | OUTPATIENT
Start: 2023-03-02 | End: 2023-03-02

## 2023-03-02 RX ORDER — FAMOTIDINE 20 MG/1
20 TABLET, FILM COATED ORAL ONCE
Status: COMPLETED | OUTPATIENT
Start: 2023-03-02 | End: 2023-03-02

## 2023-03-02 RX ORDER — METOCLOPRAMIDE 10 MG/1
10 TABLET ORAL ONCE
Status: COMPLETED | OUTPATIENT
Start: 2023-03-02 | End: 2023-03-02

## 2023-03-02 RX ORDER — DIPHENHYDRAMINE HYDROCHLORIDE 50 MG/ML
12.5 INJECTION INTRAMUSCULAR; INTRAVENOUS EVERY 4 HOURS PRN
Status: DISCONTINUED | OUTPATIENT
Start: 2023-03-02 | End: 2023-03-02

## 2023-03-02 RX ORDER — DEXTROSE, SODIUM CHLORIDE, SODIUM LACTATE, POTASSIUM CHLORIDE, AND CALCIUM CHLORIDE 5; .6; .31; .03; .02 G/100ML; G/100ML; G/100ML; G/100ML; G/100ML
INJECTION, SOLUTION INTRAVENOUS CONTINUOUS
Status: DISCONTINUED | OUTPATIENT
Start: 2023-03-02 | End: 2023-03-02

## 2023-03-02 RX ORDER — CEFAZOLIN SODIUM/WATER 2 G/20 ML
SYRINGE (ML) INTRAVENOUS
Status: DISPENSED
Start: 2023-03-02 | End: 2023-03-03

## 2023-03-02 RX ORDER — BISACODYL 10 MG
10 SUPPOSITORY, RECTAL RECTAL ONCE AS NEEDED
Status: DISCONTINUED | OUTPATIENT
Start: 2023-03-02 | End: 2023-03-04

## 2023-03-02 RX ORDER — SODIUM CHLORIDE 9 MG/ML
INJECTION, SOLUTION INTRAVENOUS CONTINUOUS PRN
Status: DISCONTINUED | OUTPATIENT
Start: 2023-03-02 | End: 2023-03-02 | Stop reason: SURG

## 2023-03-02 RX ORDER — NALOXONE HYDROCHLORIDE 0.4 MG/ML
0.08 INJECTION, SOLUTION INTRAMUSCULAR; INTRAVENOUS; SUBCUTANEOUS
Status: DISCONTINUED | OUTPATIENT
Start: 2023-03-02 | End: 2023-03-02

## 2023-03-02 RX ORDER — LIDOCAINE HYDROCHLORIDE 10 MG/ML
INJECTION, SOLUTION EPIDURAL; INFILTRATION; INTRACAUDAL; PERINEURAL
Status: DISPENSED
Start: 2023-03-02 | End: 2023-03-02

## 2023-03-02 RX ORDER — BISACODYL 10 MG
10 SUPPOSITORY, RECTAL RECTAL ONCE AS NEEDED
Status: DISCONTINUED | OUTPATIENT
Start: 2023-03-02 | End: 2023-03-02

## 2023-03-02 RX ADMIN — ONDANSETRON 4 MG: 2 INJECTION INTRAMUSCULAR; INTRAVENOUS at 13:45:00

## 2023-03-02 RX ADMIN — LIDOCAINE HYDROCHLORIDE AND EPINEPHRINE 5 ML: 15; 5 INJECTION, SOLUTION EPIDURAL at 07:26:00

## 2023-03-02 RX ADMIN — CEFAZOLIN SODIUM/WATER 2 G: 2 G/20 ML SYRINGE (ML) INTRAVENOUS at 13:40:00

## 2023-03-02 RX ADMIN — LIDOCAINE HYDROCHLORIDE AND EPINEPHRINE 15 ML: 20; 5 INJECTION, SOLUTION EPIDURAL; INFILTRATION; INTRACAUDAL; PERINEURAL at 13:33:00

## 2023-03-02 RX ADMIN — SODIUM CHLORIDE: 9 INJECTION, SOLUTION INTRAVENOUS at 13:31:00

## 2023-03-02 RX ADMIN — MORPHINE SULFATE 3 MG: 1 INJECTION, SOLUTION EPIDURAL; INTRATHECAL; INTRAVENOUS at 13:35:00

## 2023-03-02 RX ADMIN — LIDOCAINE HYDROCHLORIDE 5 ML: 10 INJECTION, SOLUTION INFILTRATION; PERINEURAL at 07:26:00

## 2023-03-02 RX ADMIN — BUPIVACAINE HYDROCHLORIDE 5 ML: 2.5 INJECTION, SOLUTION EPIDURAL; INFILTRATION; INTRACAUDAL at 07:26:00

## 2023-03-02 NOTE — PROGRESS NOTES
Received patient into room 357 via cart . Bedside shift report received from [employer]. Pt transferred to bed. Bed in lowest and locked position. Side rails up x2. VSS. IV site unremarkable. Baby present in open crib. ID bands matched with L&D RN. Patient and family oriented to unit, room and call light within reach. Safety measures in place, POC followed. Will continue to monitor per protocol. Tucker in place and draining.

## 2023-03-02 NOTE — OPERATIVE REPORT
[facility]     Section Delivery / Operative Note    Keshia Lyles Patient Status:  Inpatient    1995 MRN [id number]   Location [address] Attending Vincent Huddleston Day # 1 PCP No primary care provider on file. Pre Op Diagnosis:  IUP at 40w3d, breech presentation, GDMA1    Post Op Diagnosis:  Same - Delivered    Procedure:  primary  LTCS     Indications:  Patient is a 25year old year old  at 44w3d who presented for IOL for post EDC and GDMA1. Patient was noted to be breech presentation on complete dilation. The risks, benefits and alternatives were d/w patient including but not limited to risk of injury, infection, bleeding and subsequent  section deliveries. All questions and concerns were addressed. Patient provided verbal and written consent. Surgeon:  Pennie Serna MD    Assistant Surgeon:  Shaneka Virgen MD     Anesthesia: epidural     Complications: none     Antibiotics:  Ancef 2 grams, Azithromycin preoperatively    QBL: pending; EBL: 600 ml    IVF: 1014 mL    UO: 825m,mL, clear    Specimens: Placenta, cord gases and cord blood    Findings: normal uterus, normal tubes bilaterally, normal ovaries bilaterally. Live female infant, Nuchal Cord:  none  clear amniotic fluid noted. Infant:  Date of Delivery: 3/2/2023   Time of Delivery: 1:42 PM  Delivery Type:      Infant Sex  Information for the patient's : Laci Arteagau Girl [[id number]]   female    Infant Birthweight  Information for the patient's :  Young, Girl [[id number]]   6 lb 1.4 oz (2.76 kg)     Apgars:  1 minute: 9               5 minutes: 9               Placenta  Delivery: spontaneous  Placenta to Pathology: yes    Cord:  Cord Gases Submitted: yes  Cord Blood/Tissue Collection: yes  Cord Complications: none    Procedure:   After informed consent was obtained, the patient was taken to the operating room, prepped and draped in the usual sterile fashion. SCDs and a obrien catheter were placed and anesthesia was found to be adequate. Two grams of ancef and azithromycin were given for infection prophylaxis. She was prepared and draped in the dorsal supine position with a leftward tilt. A time out was performed. A pfannenstiel skin incision was made and carried down to the fascia. The fascia was incised and extended laterally with Gonzalez scissors. The superior aspect of the fascia was grasped with kocher clamps, and the rectus muscle was dissected off bluntly then sharply with gonzalez scissors. In a similar fashion, the inferior aspect of the fascia was grasped with kocher clamps and the underlying rectus muscle was dissected off bluntly and then sharply with Gonzalez scissors. Hemostasis was achieved with the bovie. The rectus muscle was  in the midline down to the level of the pubic symphysis. The peritoneum was entered bluntly and extended laterally. There was good visualization of the bladder. The bladder blade was inserted and the vesicouterine peritoneum identified. The lower uterine segment was identified. The lower uterine segment was incised with a scalpel. The amniotic sac was ruptured and clear fluid noted. The incision was extended bluntly with superior and inferior traction. The fetus was in bhavesh breech presentation. The buttocks were elevated out of the pelvis to the the hysterotomy site. Gentle fundal pressure was applied and legs and torso were delivered. The arms were swept in front and the shoulders delivered. The head was maintained in a flexed position facilitating th delivery of the head. Delayed cord clamping for 30 seconds. The cord was clamped and cut. The infant was handed off to the pediatrician. IV oxytocin was initiated to facilitate uterine contractions. The placenta was delivered intact with manual massage of the uterine fundus.  The inside of the uterus was wiped with a lap sponge to assure complete removal of placenta membranes. The uterine incision was closed with a 0-victyl suture in a continuous running fashion. There was a second imbricating layer with 0-vicryl suture. The uterus, tubes, and ovaries were normal appearing. The blood clots and fluid were wiped out of the abdomen and pelvis with moist laparotomy sponges. Re-inspection of the hysterotomy, peritomeum and rectus muscles was noted to be entirely hemostatic. The fascia was closed with 0-vicryl suture in a running fashion. The subcutaneous tissue was copiously irrigated and any bleeding cauterized. The skin was re-approximated with 4-0 Vicryl on Imagene Lipschutz patient tolerated the procedure well. All sponge, instrument and needle counts were correct x3. The patient toelrated the procedure well and was taken to the recovery room in a stable and satisfactory condition. The baby was in stable condition to the recovery room. Specimen:  none    Drains: obrien to dependant drainage    Condition:   stable    Complications: None; patient tolerated the procedure well.       Stacey Suarez MD    [facility]

## 2023-03-02 NOTE — ANESTHESIA POSTPROCEDURE EVALUATION
Patient: Cony Tapia    Procedure Summary     Date: 23 Room / Location: [address] [place] [address] L+D OR  [address] [place] [address] L+D OR    Anesthesia Start: 720 Anesthesia Stop:     Procedure:  SECTION (Abdomen) Diagnosis:     Surgeons: Doretha Harrell MD Anesthesiologist: Allison Bynum MD    Anesthesia Type: epidural ASA Status: 2          Anesthesia Type: epidural    Vitals Value Taken Time   BP 83/69 23 1416   Temp  23 1417   Pulse 116 23 1416   Resp 20 23 1416   SpO2 97 % 23 1416   Vitals shown include unvalidated device data.     [address] [place] [address] AN Post Evaluation:   Patient Evaluated in PACU  Patient Participation: complete - patient participated  Level of Consciousness: awake  Pain Score: 0  Pain Management: adequate  Airway Patency:patent  Dental exam unchanged from preop  Yes    Cardiovascular Status: acceptable  Respiratory Status: acceptable      Lesli Patel MD  3/2/2023 2:17 PM

## 2023-03-02 NOTE — PROGRESS NOTES
Patient transferred to mother/baby room 357 per cart in stable condition with baby and personal belongings. Accompanied by  and staff. Report given to David mother/baby RN.

## 2023-03-02 NOTE — H&P
Decision for  section. Arrived to room to assess patient. Notified by RN that patient had Elizabeth Jaleel Rose 668 but still with bulging bag and feeling pressure. Had some intermittent variables. AROM performed and IUPC placed. Patient was complete soonafter and then noted with maternal pushing that presenting part was buttocks. Decision made to perform  section and patient and partner were in agreement. Risks/benefits and procedure reviewed.

## 2023-03-02 NOTE — PROGRESS NOTES
Pt is a 25year old female admitted to [place] [address]. Patient presents with:  Scheduled Induction     Pt is  40w2d intra-uterine pregnancy. History obtained, consents signed. Oriented to room, staff, and plan of care.

## 2023-03-03 LAB
BASOPHILS # BLD AUTO: 0.05 X10(3) UL (ref 0–0.2)
BASOPHILS NFR BLD AUTO: 0.3 %
DEPRECATED RDW RBC AUTO: 41.5 FL (ref 35.1–46.3)
EOSINOPHIL # BLD AUTO: 0.86 X10(3) UL (ref 0–0.7)
EOSINOPHIL NFR BLD AUTO: 5.3 %
ERYTHROCYTE [DISTWIDTH] IN BLOOD BY AUTOMATED COUNT: 17.1 % (ref 11–15)
HCT VFR BLD AUTO: 24.9 %
HGB BLD-MCNC: 7.7 G/DL
IMM GRANULOCYTES # BLD AUTO: 0.07 X10(3) UL (ref 0–1)
IMM GRANULOCYTES NFR BLD: 0.4 %
LYMPHOCYTES # BLD AUTO: 2.36 X10(3) UL (ref 1–4)
LYMPHOCYTES NFR BLD AUTO: 14.5 %
MCH RBC QN AUTO: 21 PG (ref 26–34)
MCHC RBC AUTO-ENTMCNC: 30.9 G/DL (ref 31–37)
MCV RBC AUTO: 67.8 FL
MONOCYTES # BLD AUTO: 0.8 X10(3) UL (ref 0.1–1)
MONOCYTES NFR BLD AUTO: 4.9 %
NEUTROPHILS # BLD AUTO: 12.11 X10 (3) UL (ref 1.5–7.7)
NEUTROPHILS # BLD AUTO: 12.11 X10(3) UL (ref 1.5–7.7)
NEUTROPHILS NFR BLD AUTO: 74.6 %
PLATELET # BLD AUTO: 203 10(3)UL (ref 150–450)
RBC # BLD AUTO: 3.67 X10(6)UL
WBC # BLD AUTO: 16.3 X10(3) UL (ref 4–11)

## 2023-03-03 RX ORDER — MELATONIN
325 2 TIMES DAILY WITH MEALS
Status: DISCONTINUED | OUTPATIENT
Start: 2023-03-04 | End: 2023-03-04

## 2023-03-03 RX ORDER — GABAPENTIN 300 MG/1
300 CAPSULE ORAL EVERY 8 HOURS
Status: DISCONTINUED | OUTPATIENT
Start: 2023-03-03 | End: 2023-03-04

## 2023-03-03 NOTE — PLAN OF CARE
Problem: ANXIETY  Goal: Will report anxiety at manageable levels  Description: INTERVENTIONS:  - Administer medication as ordered  - Teach and rehearse alternative coping skills  - Provide emotional support with 1:1 interaction with staff  Outcome: Progressing     Problem: GASTROINTESTINAL - ADULT  Goal: Maintains or returns to baseline bowel function  Description: INTERVENTIONS:  - Assess bowel function  - Maintain adequate hydration with IV or PO as ordered and tolerated  - Evaluate effectiveness of GI medications  - Encourage mobilization and activity  - Obtain nutritional consult as needed  - Establish a toileting routine/schedule  - Consider collaborating with pharmacy to review patient's medication profile  Outcome: Progressing     Problem: SKIN/TISSUE INTEGRITY - ADULT  Goal: Skin integrity remains intact  Description: INTERVENTIONS  - Assess and document risk factors for pressure ulcer development  - Assess and document skin integrity  - Monitor for areas of redness and/or skin breakdown  - Initiate interventions, skin care algorithm/standards of care as needed  Outcome: Progressing  Goal: Incision(s), wounds(s) or drain site(s) healing without S/S of infection  Description: INTERVENTIONS:  - Assess and document risk factors for pressure ulcer development  - Assess and document skin integrity  - Assess and document dressing/incision, wound bed, drain sites and surrounding tissue  - Implement wound care per orders  - Initiate isolation precautions as appropriate  - Initiate Pressure Ulcer prevention bundle as indicated  Outcome: Progressing  Goal: Oral mucous membranes remain intact  Description: INTERVENTIONS  - Assess oral mucosa and hygiene practices  - Implement preventative oral hygiene regimen  - Implement oral medicated treatments as ordered  Outcome: Progressing     Problem: POSTPARTUM  Goal: Long Term Goal:Experiences normal postpartum course  Description: INTERVENTIONS:  - Assess and monitor vital signs and lab values. - Assess fundus and lochia. - Provide ice/sitz baths for perineum discomfort. - Monitor healing of incision/episiotomy/laceration, and assess for signs and symptoms of infection and hematoma. - Assess bladder function and monitor for bladder distention.  - Provide/instruct/assist with pericare as needed. - Provide VTE prophylaxis as needed. - Monitor bowel function.  - Encourage ambulation and provide assistance as needed. - Assess and monitor emotional status and provide social service/psych resources as needed. - Utilize standard precautions and use personal protective equipment as indicated. Ensure aseptic care of all intravenous lines and invasive tubes/drains.  - Obtain immunization and exposure to communicable diseases history. Outcome: Progressing  Goal: Optimize infant feeding at the breast  Description: INTERVENTIONS:  - Initiate breast feeding within first hour after birth. - Monitor effectiveness of current breast feeding efforts. - Assess support systems available to mother/family.  - Identify cultural beliefs/practices regarding lactation, letdown techniques, maternal food preferences. - Assess mother's knowledge and previous experience with breast feeding.  - Provide information as needed about early infant feeding cues (e.g., rooting, lip smacking, sucking fingers/hand) versus late cue of crying.  - Discuss/demonstrate breast feeding aids (e.g., infant sling, nursing footstool/pillows, and breast pumps). - Encourage mother/other family members to express feelings/concerns, and actively listen. - Educate father/SO about benefits of breast feeding and how to manage common lactation challenges. - Recommend avoidance of specific medications or substances incompatible with breast feeding.  - Assess and monitor for signs of nipple pain/trauma. - Instruct and provide assistance with proper latch.   - Review techniques for milk expression (breast pumping) and storage of breast milk. Provide pumping equipment/supplies, instructions and assistance, as needed. - Encourage rooming-in and breast feeding on demand.  - Encourage skin-to-skin contact. - Provide LC support as needed. - Assess for and manage engorgement. - Provide breast feeding education handouts and information on community breast feeding support. Outcome: Progressing  Goal: Establishment of adequate milk supply with medication/procedure interruptions  Description: INTERVENTIONS:  - Review techniques for milk expression (breast pumping). - Provide pumping equipment/supplies, instructions, and assistance until it is safe to breastfeed infant. Outcome: Progressing  Goal: Experiences normal breast weaning course  Description: INTERVENTIONS:  - Assess for and manage engorgement. - Instruct on breast care. - Provide comfort measures. Outcome: Progressing  Goal: Appropriate maternal -  bonding  Description: INTERVENTIONS:  - Assess caregiver- interactions. - Assess caregiver's emotional status and coping mechanisms. - Encourage caregiver to participate in  daily care. - Assess support systems available to mother/family.  - Provide /case management support as needed.   Outcome: Progressing

## 2023-03-03 NOTE — PLAN OF CARE
Problem: GASTROINTESTINAL - ADULT  Goal: Minimal or absence of nausea and vomiting  Description: INTERVENTIONS:  - Maintain adequate hydration with IV or PO as ordered and tolerated  - Nasogastric tube to low intermittent suction as ordered  - Evaluate effectiveness of ordered antiemetic medications  - Provide nonpharmacologic comfort measures as appropriate  - Advance diet as tolerated, if ordered  - Obtain nutritional consult as needed  - Evaluate fluid balance  Outcome: Progressing     Problem: GASTROINTESTINAL - ADULT  Goal: Maintains or returns to baseline bowel function  Description: INTERVENTIONS:  - Assess bowel function  - Maintain adequate hydration with IV or PO as ordered and tolerated  - Evaluate effectiveness of GI medications  - Encourage mobilization and activity  - Obtain nutritional consult as needed  - Establish a toileting routine/schedule  - Consider collaborating with pharmacy to review patient's medication profile  Outcome: Progressing     Problem: GASTROINTESTINAL - ADULT  Goal: Maintains adequate nutritional intake (undernourished)  Description: INTERVENTIONS:  - Monitor percentage of each meal consumed  - Identify factors contributing to decreased intake, treat as appropriate  - Assist with meals as needed  - Monitor I&O, WT and lab values  - Obtain nutritional consult as needed  - Optimize oral hygiene and moisture  - Encourage food from home; allow for food preferences  - Enhance eating environment  Outcome: Progressing     Problem: GASTROINTESTINAL - ADULT  Goal: Achieves appropriate nutritional intake (bariatric)  Description: INTERVENTIONS:  - Monitor for over-consumption  - Identify factors contributing to increased intake, treat as appropriate  - Monitor I&O, WT and lab values  - Obtain nutritional consult as needed  - Evaluate psychosocial factors contributing to over-consumption  Outcome: Progressing     Problem: SKIN/TISSUE INTEGRITY - ADULT  Goal: Skin integrity remains intact  Description: INTERVENTIONS  - Assess and document risk factors for pressure ulcer development  - Assess and document skin integrity  - Monitor for areas of redness and/or skin breakdown  - Initiate interventions, skin care algorithm/standards of care as needed  Outcome: Progressing     Problem: SKIN/TISSUE INTEGRITY - ADULT  Goal: Incision(s), wounds(s) or drain site(s) healing without S/S of infection  Description: INTERVENTIONS:  - Assess and document risk factors for pressure ulcer development  - Assess and document skin integrity  - Assess and document dressing/incision, wound bed, drain sites and surrounding tissue  - Implement wound care per orders  - Initiate isolation precautions as appropriate  - Initiate Pressure Ulcer prevention bundle as indicated  Outcome: Progressing     Problem: POSTPARTUM  Goal: Long Term Goal:Experiences normal postpartum course  Description: INTERVENTIONS:  - Assess and monitor vital signs and lab values. - Assess fundus and lochia. - Provide ice/sitz baths for perineum discomfort. - Monitor healing of incision/episiotomy/laceration, and assess for signs and symptoms of infection and hematoma. - Assess bladder function and monitor for bladder distention.  - Provide/instruct/assist with pericare as needed. - Provide VTE prophylaxis as needed. - Monitor bowel function.  - Encourage ambulation and provide assistance as needed. - Assess and monitor emotional status and provide social service/psych resources as needed. - Utilize standard precautions and use personal protective equipment as indicated. Ensure aseptic care of all intravenous lines and invasive tubes/drains.  - Obtain immunization and exposure to communicable diseases history. Outcome: Progressing     Problem: POSTPARTUM  Goal: Optimize infant feeding at the breast  Description: INTERVENTIONS:  - Initiate breast feeding within first hour after birth.    - Monitor effectiveness of current breast feeding efforts. - Assess support systems available to mother/family.  - Identify cultural beliefs/practices regarding lactation, letdown techniques, maternal food preferences. - Assess mother's knowledge and previous experience with breast feeding.  - Provide information as needed about early infant feeding cues (e.g., rooting, lip smacking, sucking fingers/hand) versus late cue of crying.  - Discuss/demonstrate breast feeding aids (e.g., infant sling, nursing footstool/pillows, and breast pumps). - Encourage mother/other family members to express feelings/concerns, and actively listen. - Educate father/SO about benefits of breast feeding and how to manage common lactation challenges. - Recommend avoidance of specific medications or substances incompatible with breast feeding.  - Assess and monitor for signs of nipple pain/trauma. - Instruct and provide assistance with proper latch. - Review techniques for milk expression (breast pumping) and storage of breast milk. Provide pumping equipment/supplies, instructions and assistance, as needed. - Encourage rooming-in and breast feeding on demand.  - Encourage skin-to-skin contact. - Provide LC support as needed. - Assess for and manage engorgement. - Provide breast feeding education handouts and information on community breast feeding support.    Outcome: Progressing

## 2023-03-04 VITALS
RESPIRATION RATE: 16 BRPM | DIASTOLIC BLOOD PRESSURE: 91 MMHG | OXYGEN SATURATION: 98 % | HEART RATE: 72 BPM | HEIGHT: 62 IN | BODY MASS INDEX: 31.28 KG/M2 | SYSTOLIC BLOOD PRESSURE: 133 MMHG | WEIGHT: 170 LBS | TEMPERATURE: 98 F

## 2023-03-04 PROBLEM — D62 ACUTE BLOOD LOSS ANEMIA: Status: ACTIVE | Noted: 2023-03-04

## 2023-03-04 RX ORDER — ACETAMINOPHEN 500 MG
1000 TABLET ORAL EVERY 6 HOURS
Qty: 30 TABLET | Refills: 1 | Status: SHIPPED | OUTPATIENT
Start: 2023-03-04

## 2023-03-04 RX ORDER — IBUPROFEN 600 MG/1
600 TABLET ORAL EVERY 6 HOURS
Qty: 60 TABLET | Refills: 1 | Status: SHIPPED | OUTPATIENT
Start: 2023-03-04

## 2023-03-04 RX ORDER — MELATONIN
325 2 TIMES DAILY WITH MEALS
Qty: 60 TABLET | Refills: 2 | Status: SHIPPED | OUTPATIENT
Start: 2023-03-04

## 2023-03-04 RX ORDER — GABAPENTIN 300 MG/1
300 CAPSULE ORAL EVERY 8 HOURS
Qty: 20 CAPSULE | Refills: 0 | Status: SHIPPED | OUTPATIENT
Start: 2023-03-04

## 2023-03-04 RX ORDER — PSEUDOEPHEDRINE HCL 30 MG
100 TABLET ORAL 2 TIMES DAILY
Qty: 60 CAPSULE | Refills: 1 | Status: SHIPPED | OUTPATIENT
Start: 2023-03-04

## 2023-03-04 NOTE — PLAN OF CARE
Problem: POSTPARTUM  Goal: Long Term Goal:Experiences normal postpartum course  Description: INTERVENTIONS:  - Assess and monitor vital signs and lab values. - Assess fundus and lochia. - Provide ice/sitz baths for perineum discomfort. - Monitor healing of incision/episiotomy/laceration, and assess for signs and symptoms of infection and hematoma. - Assess bladder function and monitor for bladder distention.  - Provide/instruct/assist with pericare as needed. - Provide VTE prophylaxis as needed. - Monitor bowel function.  - Encourage ambulation and provide assistance as needed. - Assess and monitor emotional status and provide social service/psych resources as needed. - Utilize standard precautions and use personal protective equipment as indicated. Ensure aseptic care of all intravenous lines and invasive tubes/drains.  - Obtain immunization and exposure to communicable diseases history. Outcome: Progressing  Goal: Establishment of adequate milk supply with medication/procedure interruptions  Description: INTERVENTIONS:  - Review techniques for milk expression (breast pumping). - Provide pumping equipment/supplies, instructions, and assistance until it is safe to breastfeed infant. Outcome: Progressing  Goal: Appropriate maternal -  bonding  Description: INTERVENTIONS:  - Assess caregiver- interactions. - Assess caregiver's emotional status and coping mechanisms. - Encourage caregiver to participate in  daily care. - Assess support systems available to mother/family.  - Provide /case management support as needed.   Outcome: Progressing

## 2023-03-04 NOTE — PLAN OF CARE
Problem: Patient Centered Care  Goal: Patient preferences are identified and integrated in the patient's plan of care  Description: Interventions:  - What would you like us to know as we care for you?   - Provide timely, complete, and accurate information to patient/family  - Incorporate patient and family knowledge, values, beliefs, and cultural backgrounds into the planning and delivery of care  - Encourage patient/family to participate in care and decision-making at the level they choose  - Honor patient and family perspectives and choices  Outcome: Progressing     Problem: GASTROINTESTINAL - ADULT  Goal: Minimal or absence of nausea and vomiting  Description: INTERVENTIONS:  - Maintain adequate hydration with IV or PO as ordered and tolerated  - Nasogastric tube to low intermittent suction as ordered  - Evaluate effectiveness of ordered antiemetic medications  - Provide nonpharmacologic comfort measures as appropriate  - Advance diet as tolerated, if ordered  - Obtain nutritional consult as needed  - Evaluate fluid balance  Outcome: Progressing  Goal: Maintains or returns to baseline bowel function  Description: INTERVENTIONS:  - Assess bowel function  - Maintain adequate hydration with IV or PO as ordered and tolerated  - Evaluate effectiveness of GI medications  - Encourage mobilization and activity  - Obtain nutritional consult as needed  - Establish a toileting routine/schedule  - Consider collaborating with pharmacy to review patient's medication profile  Outcome: Progressing  Goal: Maintains adequate nutritional intake (undernourished)  Description: INTERVENTIONS:  - Monitor percentage of each meal consumed  - Identify factors contributing to decreased intake, treat as appropriate  - Assist with meals as needed  - Monitor I&O, WT and lab values  - Obtain nutritional consult as needed  - Optimize oral hygiene and moisture  - Encourage food from home; allow for food preferences  - Enhance eating environment  Outcome: Progressing  Goal: Achieves appropriate nutritional intake (bariatric)  Description: INTERVENTIONS:  - Monitor for over-consumption  - Identify factors contributing to increased intake, treat as appropriate  - Monitor I&O, WT and lab values  - Obtain nutritional consult as needed  - Evaluate psychosocial factors contributing to over-consumption  Outcome: Progressing     Problem: SKIN/TISSUE INTEGRITY - ADULT  Goal: Skin integrity remains intact  Description: INTERVENTIONS  - Assess and document risk factors for pressure ulcer development  - Assess and document skin integrity  - Monitor for areas of redness and/or skin breakdown  - Initiate interventions, skin care algorithm/standards of care as needed  Outcome: Progressing  Goal: Incision(s), wounds(s) or drain site(s) healing without S/S of infection  Description: INTERVENTIONS:  - Assess and document risk factors for pressure ulcer development  - Assess and document skin integrity  - Assess and document dressing/incision, wound bed, drain sites and surrounding tissue  - Implement wound care per orders  - Initiate isolation precautions as appropriate  - Initiate Pressure Ulcer prevention bundle as indicated  Outcome: Progressing  Goal: Oral mucous membranes remain intact  Description: INTERVENTIONS  - Assess oral mucosa and hygiene practices  - Implement preventative oral hygiene regimen  - Implement oral medicated treatments as ordered  Outcome: Progressing     Problem: POSTPARTUM  Goal: Long Term Goal:Experiences normal postpartum course  Description: INTERVENTIONS:  - Assess and monitor vital signs and lab values. - Assess fundus and lochia. - Provide ice/sitz baths for perineum discomfort. - Monitor healing of incision/episiotomy/laceration, and assess for signs and symptoms of infection and hematoma. - Assess bladder function and monitor for bladder distention.  - Provide/instruct/assist with pericare as needed.   - Provide VTE prophylaxis as needed. - Monitor bowel function.  - Encourage ambulation and provide assistance as needed. - Assess and monitor emotional status and provide social service/psych resources as needed. - Utilize standard precautions and use personal protective equipment as indicated. Ensure aseptic care of all intravenous lines and invasive tubes/drains.  - Obtain immunization and exposure to communicable diseases history. Outcome: Progressing  Goal: Optimize infant feeding at the breast  Description: INTERVENTIONS:  - Initiate breast feeding within first hour after birth. - Monitor effectiveness of current breast feeding efforts. - Assess support systems available to mother/family.  - Identify cultural beliefs/practices regarding lactation, letdown techniques, maternal food preferences. - Assess mother's knowledge and previous experience with breast feeding.  - Provide information as needed about early infant feeding cues (e.g., rooting, lip smacking, sucking fingers/hand) versus late cue of crying.  - Discuss/demonstrate breast feeding aids (e.g., infant sling, nursing footstool/pillows, and breast pumps). - Encourage mother/other family members to express feelings/concerns, and actively listen. - Educate father/SO about benefits of breast feeding and how to manage common lactation challenges. - Recommend avoidance of specific medications or substances incompatible with breast feeding.  - Assess and monitor for signs of nipple pain/trauma. - Instruct and provide assistance with proper latch. - Review techniques for milk expression (breast pumping) and storage of breast milk. Provide pumping equipment/supplies, instructions and assistance, as needed. - Encourage rooming-in and breast feeding on demand.  - Encourage skin-to-skin contact. - Provide LC support as needed. - Assess for and manage engorgement. - Provide breast feeding education handouts and information on community breast feeding support. Outcome: Progressing  Goal: Establishment of adequate milk supply with medication/procedure interruptions  Description: INTERVENTIONS:  - Review techniques for milk expression (breast pumping). - Provide pumping equipment/supplies, instructions, and assistance until it is safe to breastfeed infant. Outcome: Progressing  Goal: Experiences normal breast weaning course  Description: INTERVENTIONS:  - Assess for and manage engorgement. - Instruct on breast care. - Provide comfort measures. Outcome: Progressing  Goal: Appropriate maternal -  bonding  Description: INTERVENTIONS:  - Assess caregiver- interactions. - Assess caregiver's emotional status and coping mechanisms. - Encourage caregiver to participate in  daily care. - Assess support systems available to mother/family.  - Provide /case management support as needed.   Outcome: Progressing

## 2023-03-04 NOTE — LACTATION NOTE
This note was copied from a baby's chart. 23 1500   Evaluation Type   Evaluation Type Inpatient   Problems & Assessment   Problems Diagnosed or Identified Sleepy   Infant Assessment Hunger cues present   Feeding Assessment   Summary Current Feeding Breastfeeding exclusively   Breastfeeding Assessment Assisted with breastfeeding w/mother's permission;Calm and ready to breastfeed;Coordinated suck/swallow;Deep latch achieved and observed;Sustained nutrititive latch w/audible swallows   Breastfeeding lasted # of minutes 20   Breastfeeding Positions football;right breast;left breast   Latch 2   Audible Sucks/Swallows 2   Type of Nipple 2   Comfort (Breast/Nipple) 2   Hold (Positioning) 1   LATCH Score 9   Other (comment) Assisted with positoning in football hold position, instructed on breast massage/ compression to encouraged nutritive suck. Discussed second night behavior of .

## 2023-03-16 ENCOUNTER — POSTPARTUM (OUTPATIENT)
Dept: OBGYN CLINIC | Facility: CLINIC | Age: 28
End: 2023-03-16
Payer: COMMERCIAL

## 2023-03-16 VITALS
BODY MASS INDEX: 29.56 KG/M2 | WEIGHT: 160.63 LBS | SYSTOLIC BLOOD PRESSURE: 118 MMHG | DIASTOLIC BLOOD PRESSURE: 74 MMHG | HEIGHT: 62 IN

## 2023-03-16 DIAGNOSIS — Z30.09 FAMILY PLANNING COUNSELING: ICD-10-CM

## 2023-03-16 DIAGNOSIS — D25.9 UTERINE LEIOMYOMA, UNSPECIFIED LOCATION: ICD-10-CM

## 2023-03-16 DIAGNOSIS — Z48.89 ENCOUNTER FOR POST SURGICAL WOUND CHECK: Primary | ICD-10-CM

## 2023-03-16 PROCEDURE — 3078F DIAST BP <80 MM HG: CPT | Performed by: OBSTETRICS & GYNECOLOGY

## 2023-03-16 PROCEDURE — 3008F BODY MASS INDEX DOCD: CPT | Performed by: OBSTETRICS & GYNECOLOGY

## 2023-03-16 PROCEDURE — 3074F SYST BP LT 130 MM HG: CPT | Performed by: OBSTETRICS & GYNECOLOGY

## 2023-03-16 NOTE — PROGRESS NOTES
OB Postpartum Wound Check    Patient presents with:  Postpartum Care: 2wk pp.  done 3/2/2023 by MA        S: 25year old   here for wound check, s/p  section on 3/1/23  for breech presentation  Patient with uncomplicated postpartum course. Denies fevers, chills, nausea, vomiting, constipation, bladder sx. Lochia slowed. Breastfeeding. Pain controlled. Physical Exam   23  1242   BP: 118/74      Gen - alert and oriented, comfortable  Abd - soft, non-tender, non-distended. Incision - [place], clean, well approximated, no erythema, no discharge, nontender  Ext - no c/c/e  Skin - warm, dry no rash. Assessment and Plan  (Z48.89) Encounter for post surgical wound check  (primary encounter diagnosis)    (D25.9) Uterine leiomyoma, unspecified location  Plan: US PELVIS W EV (CPT=76856/03416)    (Z30.09) Family planning counseling  I reviewed options with patient for contraception including all methods (OCPs, DMPA, patch, ring, condoms, IUDs, Implants, and sterilization). Discussed efficacy of each, benefits and risks of methods, typical side effects, mechanism of action, and how to use methods. I reviewed that no method is 100% effective and that failures do occur. Patient is most interested in [place]. Provided patient with information about this method and patient will return for 6 wk PP visit for placement  Advised ibuprofen before appointment    May consider treatment fiboirds     Follow up 4 weeks for routine postpartum visit.   Freddie Lagos MD

## 2023-03-31 ENCOUNTER — PATIENT MESSAGE (OUTPATIENT)
Dept: OBGYN CLINIC | Facility: CLINIC | Age: 28
End: 2023-03-31

## 2023-04-01 ENCOUNTER — TELEPHONE (OUTPATIENT)
Dept: OBGYN CLINIC | Facility: CLINIC | Age: 28
End: 2023-04-01

## 2023-04-03 NOTE — TELEPHONE ENCOUNTER
From: Ryan Saenz  To: Royal Adorno MD  Sent: 3/31/2023 2:51 PM CDT  Subject: Leave of Absence Form Update    Good Afternoon! I wanted to reach out because my Return to Work form needs to be updated and faxed to [employer] @[phone]. I have attached the form to this message. I had an emergency , so my RTW date needs to be updated to 2023 in order to include all 8 weeks needed for recovery. Please have the form filled out with this new date, then sign and date the form with today's current date. Thank you so much for your help! Please let me know when this has been sent off, or if you need any other information from me.     Thank You!  -Kemi Mcdonnell

## 2023-04-04 ENCOUNTER — TELEPHONE (OUTPATIENT)
Dept: OBGYN UNIT | Facility: HOSPITAL | Age: 28
End: 2023-04-04

## 2023-04-17 ENCOUNTER — POSTPARTUM (OUTPATIENT)
Dept: OBGYN CLINIC | Facility: CLINIC | Age: 28
End: 2023-04-17
Payer: COMMERCIAL

## 2023-04-17 VITALS
BODY MASS INDEX: 29.08 KG/M2 | DIASTOLIC BLOOD PRESSURE: 72 MMHG | SYSTOLIC BLOOD PRESSURE: 116 MMHG | WEIGHT: 158 LBS | HEIGHT: 62 IN

## 2023-04-17 DIAGNOSIS — Z30.430 ENCOUNTER FOR IUD INSERTION: ICD-10-CM

## 2023-04-17 LAB
CONTROL LINE PRESENT WITH A CLEAR BACKGROUND (YES/NO): YES YES/NO
PREGNANCY TEST, URINE: NEGATIVE

## 2023-04-17 NOTE — PROCEDURES
IUD INSERTION PROCEDURE NOTE  [address] [facility] OB/GYN    Garry Lanes is a 25year old female. Pt is here for IUD placement. Mirena  PAP: 08/22/2022  STI: neg   Pregnancy test: negative   Prior contraception: not currently active    The patient was informed regarding indication for procedure, technique, side effects and alternatives. The risks of proceudre including bleeding and infection as well as an approximately 1/1000 risk of uterine perforation with assiciated need for surgical removal of the device. Reviewed small risk of expulsion. Reviewed a <0.5% rsk of pregnancy failure with intrauterine contraception and risk for ectopic pregnancy in this case. Informed consent was obtained. Time out performed. EXAM:  : normal external vagina; speculum exam reveals normal vaginal walls and normal cervix    PROCEDURE:  The patient was prepped and draped. Tenaculum was placed on the cervix. The uterus sounded to 9.5 cm. The IUD was inserted. The IUD applicator was removed without difficulty. The IUD strings were shortened to 3 cm. Good hemostasis at the tenaculum site and the cervical os was noted. Aftercare instructions were provided to the patient. The patient indicates understanding of these issues and agrees to the plan. The patient is asked to return in 3 wks for IUD check.

## 2023-04-26 ENCOUNTER — TELEPHONE (OUTPATIENT)
Dept: OBGYN CLINIC | Facility: CLINIC | Age: 28
End: 2023-04-26

## 2023-04-26 NOTE — TELEPHONE ENCOUNTER
The patient called and stated that she had an IUD inserted on 4/17/23 but she is still in a lot of pain and she is bleeding bad now. She states the pain is becoming to much for her.

## 2023-04-26 NOTE — TELEPHONE ENCOUNTER
Called pt and c/o IUD causing pain, has taken IBUPROFEN 600 mg without relief, today NORCO 5/325 mg and pain is subsiding. Pt is changing her pad every two to two-half hours. Reviewed CBC of 03/07/2023 and 7.7, pt taking iron pills. Advised to go to ED declines, informed RD and ROSENDA, ok by [facility] will see in office tomorrow and scheduled. Called pt informed and scheduled for tomorrow at 5 pm, agrees.

## 2023-04-27 ENCOUNTER — OFFICE VISIT (OUTPATIENT)
Dept: OBGYN CLINIC | Facility: CLINIC | Age: 28
End: 2023-04-27
Payer: COMMERCIAL

## 2023-04-27 VITALS
BODY MASS INDEX: 28.16 KG/M2 | WEIGHT: 153 LBS | HEIGHT: 62 IN | DIASTOLIC BLOOD PRESSURE: 98 MMHG | SYSTOLIC BLOOD PRESSURE: 140 MMHG

## 2023-04-27 DIAGNOSIS — Z30.431 IUD CHECK UP: Primary | ICD-10-CM

## 2023-04-27 DIAGNOSIS — Z30.432 ENCOUNTER FOR REMOVAL OF INTRAUTERINE CONTRACEPTIVE DEVICE: ICD-10-CM

## 2023-04-27 PROCEDURE — 3008F BODY MASS INDEX DOCD: CPT | Performed by: OBSTETRICS & GYNECOLOGY

## 2023-04-27 PROCEDURE — 3080F DIAST BP >= 90 MM HG: CPT | Performed by: OBSTETRICS & GYNECOLOGY

## 2023-04-27 PROCEDURE — 99213 OFFICE O/P EST LOW 20 MIN: CPT | Performed by: OBSTETRICS & GYNECOLOGY

## 2023-04-27 PROCEDURE — 3077F SYST BP >= 140 MM HG: CPT | Performed by: OBSTETRICS & GYNECOLOGY

## 2023-04-27 PROCEDURE — 58301 REMOVE INTRAUTERINE DEVICE: CPT | Performed by: OBSTETRICS & GYNECOLOGY

## 2023-05-10 ENCOUNTER — TELEPHONE (OUTPATIENT)
Dept: OBGYN CLINIC | Facility: CLINIC | Age: 28
End: 2023-05-10

## 2023-05-17 ENCOUNTER — TELEPHONE (OUTPATIENT)
Dept: OBGYN CLINIC | Facility: CLINIC | Age: 28
End: 2023-05-17

## 2023-05-17 PROBLEM — D57.3 SICKLE CELL TRAIT (HCC): Status: RESOLVED | Noted: 2022-12-01 | Resolved: 2023-05-17

## 2023-05-24 ENCOUNTER — PATIENT MESSAGE (OUTPATIENT)
Dept: OBGYN CLINIC | Facility: CLINIC | Age: 28
End: 2023-05-24

## 2023-05-25 NOTE — TELEPHONE ENCOUNTER
From: Ryan Saenz  To: Kimi Cam MD  Sent: 2023 7:38 PM CDT  Subject: Heavy Bleeding- 1st Period Postpartum     Hello! I was reaching out because I am not sure if I am having a normal period. It started this past , 23. I just gave birth via  in March. So, this would be my first period postpartum, I believe. My flow has been very heavy. I have gone through about 4-5 overnight pads a day, which isn't normal for me. I was wondering if this was normal after having a baby? I had a , if that information helps.     Thank you!  -Kemi Mcdonnell

## 2023-08-31 ENCOUNTER — PATIENT MESSAGE (OUTPATIENT)
Dept: OBGYN CLINIC | Facility: CLINIC | Age: 28
End: 2023-08-31

## 2024-01-08 ENCOUNTER — PATIENT MESSAGE (OUTPATIENT)
Dept: OBGYN CLINIC | Facility: CLINIC | Age: 29
End: 2024-01-08

## 2024-01-09 ENCOUNTER — OFFICE VISIT (OUTPATIENT)
Dept: OBGYN CLINIC | Facility: CLINIC | Age: 29
End: 2024-01-09
Payer: COMMERCIAL

## 2024-01-09 VITALS
SYSTOLIC BLOOD PRESSURE: 104 MMHG | HEIGHT: 62 IN | WEIGHT: 142 LBS | BODY MASS INDEX: 26.13 KG/M2 | DIASTOLIC BLOOD PRESSURE: 70 MMHG

## 2024-01-09 DIAGNOSIS — N93.9 ABNORMAL UTERINE BLEEDING: Primary | ICD-10-CM

## 2024-01-09 DIAGNOSIS — Z30.09 FAMILY PLANNING: ICD-10-CM

## 2024-01-09 PROBLEM — Z14.8 GENETIC CARRIER: Status: RESOLVED | Noted: 2022-09-09 | Resolved: 2024-01-09

## 2024-01-09 PROCEDURE — 3008F BODY MASS INDEX DOCD: CPT | Performed by: OBSTETRICS & GYNECOLOGY

## 2024-01-09 PROCEDURE — 87491 CHLMYD TRACH DNA AMP PROBE: CPT | Performed by: OBSTETRICS & GYNECOLOGY

## 2024-01-09 PROCEDURE — 99214 OFFICE O/P EST MOD 30 MIN: CPT | Performed by: OBSTETRICS & GYNECOLOGY

## 2024-01-09 PROCEDURE — 3078F DIAST BP <80 MM HG: CPT | Performed by: OBSTETRICS & GYNECOLOGY

## 2024-01-09 PROCEDURE — 3074F SYST BP LT 130 MM HG: CPT | Performed by: OBSTETRICS & GYNECOLOGY

## 2024-01-09 PROCEDURE — 87591 N.GONORRHOEAE DNA AMP PROB: CPT | Performed by: OBSTETRICS & GYNECOLOGY

## 2024-01-09 RX ORDER — LEVONORGESTREL AND ETHINYL ESTRADIOL 0.1-0.02MG
1 KIT ORAL DAILY
Qty: 84 TABLET | Refills: 3 | Status: SHIPPED | OUTPATIENT
Start: 2024-01-09 | End: 2025-01-08
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
## 2024-01-10 LAB
C TRACH DNA SPEC QL NAA+PROBE: NEGATIVE
N GONORRHOEA DNA SPEC QL NAA+PROBE: NEGATIVE

## 2024-01-18 ENCOUNTER — HOSPITAL ENCOUNTER (OUTPATIENT)
Dept: ULTRASOUND IMAGING | Age: 29
Discharge: HOME | End: 2024-01-18
Attending: OBSTETRICS & GYNECOLOGY
Payer: COMMERCIAL

## 2024-01-18 DIAGNOSIS — D25.9 UTERINE LEIOMYOMA, UNSPECIFIED LOCATION: ICD-10-CM

## 2024-01-18 PROCEDURE — 76830 TRANSVAGINAL US NON-OB: CPT | Performed by: OBSTETRICS & GYNECOLOGY

## 2024-01-18 PROCEDURE — 76856 US EXAM PELVIC COMPLETE: CPT | Performed by: OBSTETRICS & GYNECOLOGY

## 2024-02-18 ENCOUNTER — PATIENT MESSAGE (OUTPATIENT)
Dept: OBGYN CLINIC | Facility: CLINIC | Age: 29
End: 2024-02-18

## 2024-02-19 ENCOUNTER — NURSE ONLY (OUTPATIENT)
Dept: OBGYN CLINIC | Facility: CLINIC | Age: 29
End: 2024-02-19
Payer: COMMERCIAL

## 2024-02-19 DIAGNOSIS — R39.9 UTI SYMPTOMS: Primary | ICD-10-CM

## 2024-02-19 LAB
APPEARANCE: CLEAR
BILIRUBIN: NEGATIVE
GLUCOSE (URINE DIPSTICK): NEGATIVE MG/DL
KETONES (URINE DIPSTICK): NEGATIVE MG/DL
LEUKOCYTES: NEGATIVE
MULTISTIX LOT#: ABNORMAL NUMERIC
NITRITE, URINE: NEGATIVE
OCCULT BLOOD: NEGATIVE
PH, URINE: 6 (ref 4.5–8)
PROTEIN (URINE DIPSTICK): NEGATIVE MG/DL
SPECIFIC GRAVITY: 1 (ref 1–1.03)
URINE-COLOR: YELLOW
UROBILINOGEN,SEMI-QN: 0.2 MG/DL (ref 0–1.9)

## 2024-02-19 PROCEDURE — 87086 URINE CULTURE/COLONY COUNT: CPT | Performed by: OBSTETRICS & GYNECOLOGY

## 2024-02-19 PROCEDURE — 81002 URINALYSIS NONAUTO W/O SCOPE: CPT | Performed by: OBSTETRICS & GYNECOLOGY

## 2024-02-19 NOTE — PROGRESS NOTES
Pt here for nurse visit for uti symptoms. Pt states allergies to Sulfa Antibiotics. In office UA dip done and results in Epic. Urine culture collected and sent to Lab. In office provider  aware of urine results.

## 2024-02-19 NOTE — TELEPHONE ENCOUNTER
Called pt frequency, almost burning, urgency and blood.  After sent message symptoms went away, scheduled today at 11 am nurse visit for ua/culture.  Pt agrees.        From: Lilibeth Lizama  To: Ruth Huddleston  Sent: 2/18/2024 10:26 AM CST  Subject: UTI    Good morning!     I have a UTI. Would I need to make an appointment and come into the office in order to be prescribed medication for it?    Thank you!  -Lilibeth Lizama

## 2024-12-02 ENCOUNTER — HOSPITAL ENCOUNTER (EMERGENCY)
Facility: HOSPITAL | Age: 29
Discharge: HOME | End: 2024-12-02
Attending: EMERGENCY MEDICINE
Payer: COMMERCIAL

## 2024-12-02 ENCOUNTER — APPOINTMENT (OUTPATIENT)
Dept: CT IMAGING | Facility: HOSPITAL | Age: 29
End: 2024-12-02
Attending: EMERGENCY MEDICINE
Payer: COMMERCIAL

## 2024-12-02 VITALS
OXYGEN SATURATION: 99 % | RESPIRATION RATE: 17 BRPM | TEMPERATURE: 99 F | HEART RATE: 64 BPM | SYSTOLIC BLOOD PRESSURE: 122 MMHG | DIASTOLIC BLOOD PRESSURE: 71 MMHG

## 2024-12-02 DIAGNOSIS — R11.2 NAUSEA AND VOMITING IN ADULT: ICD-10-CM

## 2024-12-02 DIAGNOSIS — D64.9 SYMPTOMATIC ANEMIA: Primary | ICD-10-CM

## 2024-12-02 DIAGNOSIS — N93.9 VAGINAL BLEEDING: ICD-10-CM

## 2024-12-02 LAB
ANION GAP SERPL CALC-SCNC: 11 MMOL/L (ref 0–18)
ANTIBODY SCREEN: NEGATIVE
ATRIAL RATE: 61 BPM
B-HCG UR QL: NEGATIVE
BASOPHILS # BLD AUTO: 0.04 X10(3) UL (ref 0–0.2)
BASOPHILS NFR BLD AUTO: 0.4 %
BILIRUB UR QL: NEGATIVE
BUN BLD-MCNC: 12 MG/DL (ref 9–23)
BUN/CREAT SERPL: 14.8 (ref 10–20)
CALCIUM BLD-MCNC: 9.8 MG/DL (ref 8.7–10.4)
CHLORIDE SERPL-SCNC: 112 MMOL/L (ref 98–112)
CLARITY UR: CLEAR
CO2 SERPL-SCNC: 22 MMOL/L (ref 21–32)
CREAT BLD-MCNC: 0.81 MG/DL
DEPRECATED RDW RBC AUTO: 39.8 FL (ref 35.1–46.3)
EGFRCR SERPLBLD CKD-EPI 2021: 99 ML/MIN/1.73M2 (ref 60–?)
EOSINOPHIL # BLD AUTO: 0.06 X10(3) UL (ref 0–0.7)
EOSINOPHIL NFR BLD AUTO: 0.5 %
ERYTHROCYTE [DISTWIDTH] IN BLOOD BY AUTOMATED COUNT: 22.3 % (ref 11–15)
GLUCOSE BLD-MCNC: 108 MG/DL (ref 70–99)
GLUCOSE BLDC GLUCOMTR-MCNC: 122 MG/DL (ref 70–99)
GLUCOSE UR-MCNC: NORMAL MG/DL
HCT VFR BLD AUTO: 22.4 %
HGB BLD-MCNC: 5.9 G/DL
HGB UR QL STRIP.AUTO: NEGATIVE
IMM GRANULOCYTES # BLD AUTO: 0.04 X10(3) UL (ref 0–1)
IMM GRANULOCYTES NFR BLD: 0.4 %
KETONES UR-MCNC: 20 MG/DL
LEUKOCYTE ESTERASE UR QL STRIP.AUTO: NEGATIVE
LYMPHOCYTES # BLD AUTO: 1.34 X10(3) UL (ref 1–4)
LYMPHOCYTES NFR BLD AUTO: 12.2 %
MCH RBC QN AUTO: 14.1 PG (ref 26–34)
MCHC RBC AUTO-ENTMCNC: 26.3 G/DL (ref 31–37)
MCV RBC AUTO: 53.6 FL
MONOCYTES # BLD AUTO: 0.14 X10(3) UL (ref 0.1–1)
MONOCYTES NFR BLD AUTO: 1.3 %
NEUTROPHILS # BLD AUTO: 9.38 X10 (3) UL (ref 1.5–7.7)
NEUTROPHILS # BLD AUTO: 9.38 X10(3) UL (ref 1.5–7.7)
NEUTROPHILS NFR BLD AUTO: 85.2 %
NITRITE UR QL STRIP.AUTO: NEGATIVE
OSMOLALITY SERPL CALC.SUM OF ELEC: 300 MOSM/KG (ref 275–295)
P AXIS: 46 DEGREES
P-R INTERVAL: 146 MS
PH UR: 7 [PH] (ref 5–8)
PLATELET # BLD AUTO: 553 10(3)UL (ref 150–450)
PLATELET MORPHOLOGY: NORMAL
PLATELETS.RETICULATED NFR BLD AUTO: 6.4 % (ref 0–7)
POTASSIUM SERPL-SCNC: 4.1 MMOL/L (ref 3.5–5.1)
PROT UR-MCNC: 20 MG/DL
Q-T INTERVAL: 416 MS
QRS DURATION: 84 MS
QTC CALCULATION (BEZET): 418 MS
R AXIS: 78 DEGREES
RBC # BLD AUTO: 4.18 X10(6)UL
RH BLOOD TYPE: POSITIVE
SODIUM SERPL-SCNC: 145 MMOL/L (ref 136–145)
SP GR UR STRIP: 1.02 (ref 1–1.03)
T AXIS: 37 DEGREES
UROBILINOGEN UR STRIP-ACNC: NORMAL
VENTRICULAR RATE: 61 BPM
WBC # BLD AUTO: 11 X10(3) UL (ref 4–11)

## 2024-12-02 PROCEDURE — 36430 TRANSFUSION BLD/BLD COMPNT: CPT

## 2024-12-02 PROCEDURE — 99285 EMERGENCY DEPT VISIT HI MDM: CPT

## 2024-12-02 PROCEDURE — 96361 HYDRATE IV INFUSION ADD-ON: CPT

## 2024-12-02 PROCEDURE — 86920 COMPATIBILITY TEST SPIN: CPT

## 2024-12-02 PROCEDURE — 81025 URINE PREGNANCY TEST: CPT

## 2024-12-02 PROCEDURE — 85025 COMPLETE CBC W/AUTO DIFF WBC: CPT | Performed by: EMERGENCY MEDICINE

## 2024-12-02 PROCEDURE — 93010 ELECTROCARDIOGRAM REPORT: CPT

## 2024-12-02 PROCEDURE — 93005 ELECTROCARDIOGRAM TRACING: CPT

## 2024-12-02 PROCEDURE — 86900 BLOOD TYPING SEROLOGIC ABO: CPT | Performed by: EMERGENCY MEDICINE

## 2024-12-02 PROCEDURE — 81001 URINALYSIS AUTO W/SCOPE: CPT | Performed by: EMERGENCY MEDICINE

## 2024-12-02 PROCEDURE — 85060 BLOOD SMEAR INTERPRETATION: CPT | Performed by: EMERGENCY MEDICINE

## 2024-12-02 PROCEDURE — 80048 BASIC METABOLIC PNL TOTAL CA: CPT | Performed by: EMERGENCY MEDICINE

## 2024-12-02 PROCEDURE — 80048 BASIC METABOLIC PNL TOTAL CA: CPT

## 2024-12-02 PROCEDURE — 96376 TX/PRO/DX INJ SAME DRUG ADON: CPT

## 2024-12-02 PROCEDURE — 86850 RBC ANTIBODY SCREEN: CPT | Performed by: EMERGENCY MEDICINE

## 2024-12-02 PROCEDURE — 82962 GLUCOSE BLOOD TEST: CPT

## 2024-12-02 PROCEDURE — 96374 THER/PROPH/DIAG INJ IV PUSH: CPT

## 2024-12-02 PROCEDURE — 85025 COMPLETE CBC W/AUTO DIFF WBC: CPT

## 2024-12-02 PROCEDURE — 96375 TX/PRO/DX INJ NEW DRUG ADDON: CPT

## 2024-12-02 PROCEDURE — 74177 CT ABD & PELVIS W/CONTRAST: CPT | Performed by: EMERGENCY MEDICINE

## 2024-12-02 PROCEDURE — 86901 BLOOD TYPING SEROLOGIC RH(D): CPT | Performed by: EMERGENCY MEDICINE

## 2024-12-02 RX ORDER — MORPHINE SULFATE 4 MG/ML
4 INJECTION, SOLUTION INTRAMUSCULAR; INTRAVENOUS ONCE
Status: COMPLETED | OUTPATIENT
Start: 2024-12-02 | End: 2024-12-02

## 2024-12-02 RX ORDER — ONDANSETRON 2 MG/ML
4 INJECTION INTRAMUSCULAR; INTRAVENOUS ONCE
Status: COMPLETED | OUTPATIENT
Start: 2024-12-02 | End: 2024-12-02

## 2024-12-02 RX ORDER — ONDANSETRON 4 MG/1
4 TABLET, ORALLY DISINTEGRATING ORAL EVERY 4 HOURS PRN
Qty: 10 TABLET | Refills: 0 | Status: SHIPPED | OUTPATIENT
Start: 2024-12-02 | End: 2024-12-05

## 2024-12-02 NOTE — ED QUICK NOTES
Pt presents to the ED for eval of abdominal pain. Pt reports lower abdominal pain associated with N/V x 2 days. Denies any blood in emesis. Denies any fever/chills or  complaints, but states last time she had symptoms like this she had a bladder infection. Family at the bedside. Here for further eval.

## 2024-12-02 NOTE — ED INITIAL ASSESSMENT (HPI)
Pt to ED for syncopal episodes along with abdominal pain with nausea for the past few days. Per family, pt has had these symptoms in the past where pt will have intense abdominal pain followed by passing out. This RN witnessed pt pass out while in triage.

## 2024-12-02 NOTE — ED PROVIDER NOTES
Patient Seen in: [facility] Emergency Department      History     Chief Complaint   Patient presents with    Syncope    Abdomen/Flank Pain     Stated Complaint: Syncope;Vomiting    Subjective:   HPI          Objective:     Past Medical History:    Anemia    Asthma (HCC)    Last symptoms approximately at age 13.     Gestational diabetes (HCC)              Past Surgical History:   Procedure Laterality Date      2023    for breech                Social History     Socioeconomic History    Marital status:    Tobacco Use    Smoking status: Never    Smokeless tobacco: Never   Vaping Use    Vaping status: Never Used   Substance and Sexual Activity    Alcohol use: Yes     Comment: soc    Drug use: Not Currently     Types: Cannabis     Comment: daily    Sexual activity: Yes     Partners: Male   Other Topics Concern    Blood Transfusions No   Social History Narrative    Lives with  and dog    No abuse     Social Drivers of Health     Financial Resource Strain: Low Risk  (2023)    Financial Resource Strain     Difficulty of Paying Living Expenses: Not very hard     Med Affordability: No   Food Insecurity: No Food Insecurity (2023)    Food Insecurity     Food Insecurity: Never true   Transportation Needs: No Transportation Needs (2023)    Transportation Needs     Lack of Transportation: No   Stress: No Stress Concern Present (2023)    Stress     Feeling of Stress : No   Housing Stability: Low Risk  (2023)    Housing Stability     Housing Instability: No                  Physical Exam     ED Triage Vitals   BP 24 1413 130/78   Pulse 24 1413 58   Resp 24 1413 18   Temp 24 1413 97.4 °F (36.3 °C)   Temp src 24 1413 Temporal   SpO2 24 1413 100 %   O2 Device 24 1530 None (Room air)       Current Vitals:   No data recorded      Physical Exam        ED Course     Labs Reviewed   CBC WITH DIFFERENTIAL WITH PLATELET - Abnormal; Notable  for the following components:       Result Value    HGB 5.9 (*)     HCT 22.4 (*)     MCV 53.6 (*)     MCH 14.1 (*)     MCHC 26.3 (*)     RDW 22.3 (*)     .0 (*)     Neutrophil Absolute Prelim 9.38 (*)     Neutrophil Absolute 9.38 (*)     All other components within normal limits   BASIC METABOLIC PANEL (8) - Abnormal; Notable for the following components:    Glucose 108 (*)     Calculated Osmolality 300 (*)     All other components within normal limits   URINALYSIS WITH CULTURE REFLEX - Abnormal; Notable for the following components:    Ketones Urine 20 (*)     Protein Urine 20 (*)     All other components within normal limits   RBC MORPHOLOGY SCAN - Abnormal; Notable for the following components:    RBC Morphology See morphology below (*)     Microcytosis 2+ (*)     All other components within normal limits   POCT GLUCOSE - Abnormal; Notable for the following components:    POC Glucose  122 (*)     All other components within normal limits   POCT PREGNANCY URINE - Normal   SCAN SLIDE   MD BLOOD SMEAR CONSULT   TYPE AND SCREEN    Narrative:     The following orders were created for panel order Type and screen.  Procedure                               Abnormality         Status                     ---------                               -----------         ------                     ABORH (Blood Type)[[id number]]                               Final result               Antibody Screen[[id number]]                                  Final result                 Please view results for these tests on the individual orders.   PREPARE RBC   ABORH (BLOOD TYPE)   ANTIBODY SCREEN   RAINBOW DRAW LAVENDER   RAINBOW DRAW LIGHT GREEN   RAINBOW DRAW BLUE   RAINBOW DRAW GOLD     EKG    Rate, intervals and axes as noted on EKG Report.  Rate: 61  Rhythm: Sinus Rhythm  Reading: Normal sinus rhythm without signs of acute ischemia or abnormal intervals.    EKG Interpretation  Rate: 67  Rhythm: normal sinus rhythm  Interpretation:  Normal sinus rhythm without signs of acute ischemia or other abnormal intervals.                       MDM      29-year-old female with history of fibroids and anemia presents today with abdominal pain, nausea, vomiting, and syncope.  Patient states that she has been feeling unwell for a few days.  Starting last night, she has had profuse vomiting and kept nothing down.  She has generalized abdominal pain worse in the left lower quadrant, a tingling sensation with urine, but denies diarrhea, bleeding, melena, fevers, or other new symptoms.    On exam, vitals reassuring, patient pale appearing and uncomfortable.  Tenderness to palpation throughout the abdomen but worst in the left lower quadrant.  Dry mucous membranes.    Differential: Gastroenteritis, ectopic pregnancy, obstruction, diverticulitis, symptomatic anemia    Labs showed hemoglobin dropped from 7.7>5.9 in 1 year  U preg negative.    I performed and interpreted a bedside FAST exam.  No evidence of intra-abdominal free fluid    Patient was ordered for 2 units PRBCs for her low hemoglobin level.   She was also given ondansetron for nausea to good effect.    Labs were otherwise reassuring and patient tolerated the blood transfusion well.     She was instructed to follow-up closely with her OB/GYN for ongoing workup and management of her bleeding and given careful return precautions.  Medical Decision Making      Disposition and Plan     Clinical Impression:  1. Symptomatic anemia    2. Vaginal bleeding    3. Nausea and vomiting in adult         Disposition:  Discharge  12/2/2024 10:17 pm    Follow-up:  No follow-up provider specified.        Medications Prescribed:  Discharge Medication List as of 12/2/2024 10:41 PM        START taking these medications    Details   ondansetron 4 MG Oral Tablet Dispersible Take 1 tablet (4 mg total) by mouth every 4 (four) hours as needed for Nausea., Print, Disp-10 tablet, R-0                 Supplementary Documentation:

## 2024-12-02 NOTE — ED QUICK NOTES
Lilibeth Lizama   [id number]   Room #:  33/33    Ordering Physician: Thomas    Blood product order has been released.  Please forward unit to:    Call Extension: [phone]    Send to Tube Station: [phone]    Please indicate which product you would like for blood bank to send:    Packed Red Blood Cells (PRBC)    Angy PARIS RN  12/2/2024 @ 4:34 PM

## 2024-12-03 ENCOUNTER — PATIENT MESSAGE (OUTPATIENT)
Dept: OBGYN CLINIC | Facility: CLINIC | Age: 29
End: 2024-12-03

## 2024-12-03 LAB
ATRIAL RATE: 67 BPM
P AXIS: 21 DEGREES
P-R INTERVAL: 154 MS
Q-T INTERVAL: 428 MS
QRS DURATION: 74 MS
QTC CALCULATION (BEZET): 452 MS
R AXIS: 49 DEGREES
T AXIS: 44 DEGREES
VENTRICULAR RATE: 67 BPM

## 2024-12-03 NOTE — TELEPHONE ENCOUNTER
Called patient to inquire about ER visit and recommendations. Patient stated that she went to ED on 12/2 due to n/v, unable to tolerant food/fluids, and abdominal pain. Patient had ended her period on 11/21 and denied saturating a pad hourly during that time. Received 2 units of PRBCs due to hemoglobin levels being 5.9. Per patient, scans came back normal. ER physician informed the patient to follow-up with OBGYNE provider due to history of fibroids. RN scheduled appt with Dr. Wood on 12/3. Patient agreeable to plan

## 2024-12-03 NOTE — DISCHARGE INSTRUCTIONS
Follow up with your doctor and with your OBGYN for ongoing workup of your anemia.   Return to the ER if you have increasing bleeding, lightheadedness, severe pain, fever, or other new and concerning symptoms.

## 2024-12-03 NOTE — ED QUICK NOTES
Pt on ED stretcher in position of comfort. Pt denied nausea/ vomiting. Reported she was able to keep PO fluids down. Updated on plan of care. Stated understanding. Awaiting for blood transfusion to finish.

## 2024-12-04 PROBLEM — D25.1 INTRAMURAL AND SUBSEROUS LEIOMYOMA OF UTERUS: Status: ACTIVE | Noted: 2024-12-04

## 2024-12-04 PROBLEM — D25.1 INTRAMURAL AND SUBSEROUS LEIOMYOMA OF UTERUS: Chronic | Status: ACTIVE | Noted: 2024-12-04

## 2024-12-04 PROBLEM — D25.2 INTRAMURAL AND SUBSEROUS LEIOMYOMA OF UTERUS: Chronic | Status: ACTIVE | Noted: 2024-12-04

## 2024-12-04 PROBLEM — D50.0 BLOOD LOSS ANEMIA: Chronic | Status: ACTIVE | Noted: 2024-12-04

## 2024-12-04 PROBLEM — D50.0 BLOOD LOSS ANEMIA: Status: ACTIVE | Noted: 2024-12-04

## 2024-12-04 PROBLEM — D25.2 INTRAMURAL AND SUBSEROUS LEIOMYOMA OF UTERUS: Status: ACTIVE | Noted: 2024-12-04

## 2024-12-04 LAB
BLOOD TYPE BARCODE: 7300
UNIT VOLUME: 350 ML

## 2024-12-05 ENCOUNTER — OFFICE VISIT (OUTPATIENT)
Dept: OBGYN CLINIC | Facility: CLINIC | Age: 29
End: 2024-12-05
Payer: COMMERCIAL

## 2024-12-05 ENCOUNTER — LAB ENCOUNTER (OUTPATIENT)
Dept: LAB | Age: 29
End: 2024-12-05
Attending: OBSTETRICS & GYNECOLOGY
Payer: COMMERCIAL

## 2024-12-05 VITALS
WEIGHT: 132.19 LBS | SYSTOLIC BLOOD PRESSURE: 122 MMHG | BODY MASS INDEX: 24.33 KG/M2 | HEIGHT: 62 IN | DIASTOLIC BLOOD PRESSURE: 74 MMHG

## 2024-12-05 DIAGNOSIS — D25.1 INTRAMURAL AND SUBSEROUS LEIOMYOMA OF UTERUS: Chronic | ICD-10-CM

## 2024-12-05 DIAGNOSIS — N92.4 MENORRHAGIA, PREMENOPAUSAL: ICD-10-CM

## 2024-12-05 DIAGNOSIS — D50.0 BLOOD LOSS ANEMIA: Primary | Chronic | ICD-10-CM

## 2024-12-05 DIAGNOSIS — D25.2 INTRAMURAL AND SUBSEROUS LEIOMYOMA OF UTERUS: Chronic | ICD-10-CM

## 2024-12-05 LAB
DEPRECATED RDW RBC AUTO: 66.8 FL (ref 35.1–46.3)
ERYTHROCYTE [DISTWIDTH] IN BLOOD BY AUTOMATED COUNT: 33.3 % (ref 11–15)
HCT VFR BLD AUTO: 29.2 %
HGB BLD-MCNC: 8.6 G/DL
MCH RBC QN AUTO: 18 PG (ref 26–34)
MCHC RBC AUTO-ENTMCNC: 29.5 G/DL (ref 31–37)
MCV RBC AUTO: 61 FL
PLATELET # BLD AUTO: 419 10(3)UL (ref 150–450)
PLATELETS.RETICULATED NFR BLD AUTO: 6.5 % (ref 0–7)
RBC # BLD AUTO: 4.79 X10(6)UL
TSI SER-ACNC: 1.13 UIU/ML (ref 0.55–4.78)
WBC # BLD AUTO: 9.5 X10(3) UL (ref 4–11)

## 2024-12-05 PROCEDURE — 85027 COMPLETE CBC AUTOMATED: CPT | Performed by: OBSTETRICS & GYNECOLOGY

## 2024-12-05 PROCEDURE — 84443 ASSAY THYROID STIM HORMONE: CPT | Performed by: OBSTETRICS & GYNECOLOGY

## 2024-12-05 PROCEDURE — 3078F DIAST BP <80 MM HG: CPT | Performed by: OBSTETRICS & GYNECOLOGY

## 2024-12-05 PROCEDURE — 99214 OFFICE O/P EST MOD 30 MIN: CPT | Performed by: OBSTETRICS & GYNECOLOGY

## 2024-12-05 PROCEDURE — 3008F BODY MASS INDEX DOCD: CPT | Performed by: OBSTETRICS & GYNECOLOGY

## 2024-12-05 PROCEDURE — 3074F SYST BP LT 130 MM HG: CPT | Performed by: OBSTETRICS & GYNECOLOGY

## 2024-12-05 RX ORDER — TRANEXAMIC ACID 650 MG/1
1300 TABLET ORAL EVERY 8 HOURS PRN
Qty: 50 TABLET | Refills: 2 | Status: SHIPPED | OUTPATIENT
Start: 2024-12-05 | End: 2024-12-10

## 2024-12-05 RX ORDER — FERROUS SULFATE 325(65) MG
325 TABLET ORAL 2 TIMES DAILY
Qty: 90 TABLET | Refills: 3 | Status: SHIPPED | OUTPATIENT
Start: 2024-12-05 | End: 2025-12-05

## 2024-12-05 NOTE — TELEPHONE ENCOUNTER
RN called pt and sked her for sonohyst on 12/31 at 4:00. Pt verbalized understanding and agreed with plan of care.

## 2024-12-05 NOTE — PROGRESS NOTES
CC: Patient is here for FU from ER    HPI: Patient is a 29 year old  was seen in ER  for nausea, abdominal pain and was diagnosed with severe anemia, received 2 units PRBCs.     2024 she was evaluated for heavy bleeding and was given OCP's which she did not take b/c she did not like how she felt when she took the meds as teen ager (grogginess, heavier bleeding, worse cramps.)    For the last 6 months she has had very heavy periods, lasting 5- 7 days, with 2 - 3 days of heavy bleeding, wearing overnight pad and needing to change every 1.5 hours. She denies bleeding btw her periods. She has not recently had a lot of pain with her periods. \    She desires future child bearing.       Patient's last menstrual period was 2024.    OB History    Para Term  AB Living   2 1 1   1 1   SAB IAB Ectopic Multiple Live Births         0 1      # Outcome Date GA Lbr Som/2nd Weight Sex Type Anes PTL Lv   2 Term 23 40w3d / 00:57 6 lb 1.4 oz (2.76 kg) F Caesarean EPI N BILLY   1 AB                GYN hx:       LPS:    Past Medical History:    Anemia    Asthma (HCC)    Last symptoms approximately at age 13.     Fibroid uterus    Gestational diabetes (HCC)     Past Surgical History:   Procedure Laterality Date      2023    for breech     Allergies[1]  Family History   Problem Relation Age of Onset    Hypertension Mother     Hypertension Father     Diabetes Father     Anemia Father     Diabetes Brother     Asthma Brother     Other (Other) Maternal Grandmother         alzheimers    Cancer Maternal Grandmother         breast    No Known Problems Maternal Grandfather     No Known Problems Paternal Grandmother     No Known Problems Paternal Grandfather     Breast Cancer Neg     Ovarian Cancer Neg     Colon Cancer Neg      Social History     Socioeconomic History    Marital status:      Spouse name: Not on file    Number of children: Not on file    Years of education: Not on file     Highest education level: Not on file   Occupational History    Occupation: compliance agent for [employer]   Tobacco Use    Smoking status: Never    Smokeless tobacco: Never   Vaping Use    Vaping status: Never Used   Substance and Sexual Activity    Alcohol use: Yes     Comment: soc    Drug use: Not Currently     Types: Cannabis     Comment: daily    Sexual activity: Yes     Partners: Male   Other Topics Concern     Service Not Asked    Blood Transfusions Yes    Caffeine Concern Not Asked    Occupational Exposure Not Asked    Hobby Hazards Not Asked    Sleep Concern Not Asked    Stress Concern Not Asked    Weight Concern Not Asked    Special Diet Not Asked    Back Care Not Asked    Exercise Not Asked    Bike Helmet Not Asked    Seat Belt Not Asked    Self-Exams Not Asked   Social History Narrative    Lives with  and dog, and child    No abuse     Social Drivers of Health     Financial Resource Strain: Low Risk  (2/20/2023)    Financial Resource Strain     Difficulty of Paying Living Expenses: Not very hard     Med Affordability: No   Food Insecurity: No Food Insecurity (2/20/2023)    Food Insecurity     Food Insecurity: Never true   Transportation Needs: No Transportation Needs (2/20/2023)    Transportation Needs     Lack of Transportation: No   Physical Activity: Not on file   Stress: No Stress Concern Present (2/20/2023)    Stress     Feeling of Stress : No   Social Connections: Not on file   Housing Stability: Low Risk  (2/20/2023)    Housing Stability     Housing Instability: No     Housing Instability Emergency: Not on file       Medications reviewed. See active list.     /74   Ht 62\"   Wt 132 lb 3.2 oz (60 kg)   LMP 11/18/2024   BMI 24.18 kg/m²       Exam:   GENERAL: well developed, well nourished, in no apparent distress  ABDOMEN: Soft, non distended; non tender, no masses.  Liver and spleen non-tender, no enlargement. No palpable hernias  GYNE/:  External Genitalia: Normal  appearing, no lesions, normal hair distribution   Urethral meatus appear wnl, no abnormal discharge or lesions noted.   Bladder: well supported, urethra wnl, no palpable tenderness or masses, no discharge  Vagina: normal pink mucosa, no lesions, normal clear discharge.   Uterus: 12 W size fibroid uterus.   Cervix: pink, no lesions grossly visible, no discharge  Adnexa: non tender, no palpable masses, normal size  Anus:  No lesions or visible hemorrhoids  Component      Latest Ref Rn 12/2/2024   WBC      4.0 - 11.0 x10(3) uL 11.0    RBC      3.80 - 5.30 x10(6)uL 4.18    Hemoglobin      12.0 - 16.0 g/dL 5.9 (LL)    Hematocrit      35.0 - 48.0 % 22.4 (L)    MCV      80.0 - 100.0 fL 53.6 (L)    MCH      26.0 - 34.0 pg 14.1 (L)    MCHC      31.0 - 37.0 g/dL 26.3 (L)    RDW-SD      35.1 - 46.3 fL 39.8    RDW      11.0 - 15.0 % 22.3 (H)    Platelet Count      150.0 - 450.0 10(3)uL 553.0 (H)    Immature Platelet Fraction      0.0 - 7.0 % 6.4    Prelim Neutrophil Abs      1.50 - 7.70 x10 (3) uL 9.38 (H)    Neutrophils Absolute      1.50 - 7.70 x10(3) uL 9.38 (H)    Lymphocytes Absolute      1.00 - 4.00 x10(3) uL 1.34    Monocytes Absolute      0.10 - 1.00 x10(3) uL 0.14    Eosinophils Absolute      0.00 - 0.70 x10(3) uL 0.06    Basophils Absolute      0.00 - 0.20 x10(3) uL 0.04    Immature Granulocyte Absolute      0.00 - 1.00 x10(3) uL 0.04    Neutrophils %      % 85.2    Lymphocytes %      % 12.2    Monocytes %      % 1.3    Eosinophils %      % 0.5    Basophils %      % 0.4    Immature Granulocyte %      % 0.4    Glucose      70 - 99 mg/dL 108 (H)    Sodium      136 - 145 mmol/L 145    Potassium      3.5 - 5.1 mmol/L 4.1    Chloride      98 - 112 mmol/L 112    Carbon Dioxide, Total      21.0 - 32.0 mmol/L 22.0    ANION GAP      0 - 18 mmol/L 11    BUN      9 - 23 mg/dL 12    CREATININE      0.55 - 1.02 mg/dL 0.81    BUN/CREATININE RATIO      10.0 - 20.0  14.8    CALCIUM      8.7 - 10.4 mg/dL 9.8    CALCULATED  OSMOLALITY      275 - 295 mOsm/kg 300 (H)    EGFR      >=60 mL/min/1.73m2 99    Color Urine      Yellow  Dark-Yellow    Clarity Urine      Clear  Clear    Spec Gravity      1.005 - 1.030  1.017    Glucose Urine      Normal mg/dL Normal    Bilirubin Urine      Negative  Negative    Ketones, UA      Negative mg/dL 20 !    Blood Urine      Negative  Negative    PH Urine      5.0 - 8.0  7.0    Protein Urine      Negative mg/dL 20 !    Urobilinogen Urine      Normal  Normal    Nitrite Urine      Negative  Negative    Leukocyte Esterase       Negative  Negative    RBC MORPHOLOGY      Normal, Slide reviewed, see previous RBC morphology.  See morphology below !    PLATELET MORPHOLOGY      Normal  Normal    MICROCYTOSIS         2+ !    POLYCHROMASIA         1+    OVALOCYTES         1+    TARGET CELLS         1+    ABO BLOOD TYPE B    RH Factor Positive    POC GLUCOSE      70 - 99 mg/dL 122 (H)    SLIDE REVIEW See MD blood smear consultation.    MD Blood Smear Consult Evaluation of CBC with differential data and the peripheral blood smear demonstrates hypochromic microcytic anemia with decreased absolute RBC count, mild thrombocytosis, and mild neutrophilia. …    POCT urine pregnancy      Negative  Negative    ANTIBODY SCREEN Negative       Legend:  (LL) Low Panic  (L) Low  (H) High  ! Abnormal      Narrative   PROCEDURE: CT ABDOMEN + PELVIS (CONTRAST ONLY) (CPT=74177)     COMPARISON: None.     INDICATIONS: Syncope, emesis.     TECHNIQUE: CT images of the abdomen and pelvis were obtained with non-ionic intravenous contrast material.  Automated exposure control for dose reduction was used. Adjustment of the mA and/or kV was done based on the patient's size. Use of iterative  reconstruction technique for dose reduction was used.  Dose information is transmitted to the ACR (American College of Radiology) NRDR (National Radiology Data Registry) which includes the Dose Index Registry.     FINDINGS:  Normal lower chest     Normal  liver with tiny right hepatic lobe cysts.  Normal portal vein.  Normal gallbladder     Normal pancreas and spleen     Normal adrenals.  Normal-size unobstructed kidneys with no calculi.  Mild urothelial enhancement in the left renal pelvis series 2, image 48.     Normal aorta.  Bowel is unobstructed.  No free air.  Normal appendix.     Uterine fibroids are present including partly rim calcified left uterine fibroid.  Bladder is decompressed     No bone lesion     CONCLUSION: Urothelial enhancement in the left renal pelvis is suggestive of ascending UTI  Finalized by (CST): Riki Alvares MD on 12/02/2024 at 5:14 PM                   Result History    CT ABDOMEN+PELVIS(CONTRAST ONLY)(CPT=74177) (Order #[id number]) on 12/2/2024 - Order Result History Report  Signed by    Signed Time Phone Pager   Glenn Alvares MD 12/02/2024 17:14 [phone]      Result Information    Status: Final result (Exam End: 12/2/2024  4:56 PM) Provider Status: Open     Exam Details    Performed Procedure Technologist Supporting Staff Performing Physician   CT ABDOMEN+PELVIS(CONTRAST ONLY)(CPT=74177) Gurinder Hess           Appointment Date/Status Modality Department    12/2/2024     Completed [facility] CT RM1 CARD [facility] CT           Begin Exam End Exam  End Exam Questionnaires   12/2/2024  4:49 PM 12/2/2024  4:56 PM   CT END EXAM            In Basket Tracking [[id number]]    No In Basket messages found for this order.                  Created by    Encounter creation information not available  [employer] Results Release    [employer] Status: Active  Results Release       CT ABDOMEN+PELVIS(CONTRAST ONLY)(CPT=74177): Patient Communication     Add Comments   Add Notifications      Release to [employer] User Info:    By User Type Action When    Time Based [3] Release [1]             Order Report     Order Details  Results  US PELVIS W EV (CPT=76856/61091) [[id number]] (Accession [id number]) (Order [id number])   Printable Result Report    Open Hard Copy Result  Report (Order #[id number] - US PELVIS W EV (CPT=76856/51297))      PACS Images     Show images for US PELVIS W EV (CPT=76856/23843)  Study Result    Narrative   PROCEDURE: US PELVIS W EV (CPT=76856/02440)     COMPARISON: None.     INDICATIONS: Uterine leiomyoma, unspecified location.     TECHNIQUE: Pelvic ultrasound using transabdominal and transvaginal technique.  A transvaginal scan was performed for better assessment of the uterus and adnexal structures.     FINDINGS:  UTERUS:   Anteverted.  Size is 12.4 x 5.0 x 7.2 cm.       ENDOMETRIUM: Endometrial thickness is 9 mm.  No fluid or mass in the endometrial canal.    MYOMETRIUM: Normal echogenicity.  3 hypoechoic subserosal fibroids are seen.  These include a 2.3 x 2.2 x 2.1 cm fibroid in the posterior uterine body, a 2.5 x 1.7 x 1.6 cm fibroid in the right anterior uterine body, as well as a 1.5 x 1.3 x 1.8 cm  fibroid in the left posterior lower uterine body (the latter of which is best seen from a transabdominal approach).  The cervix is unremarkable.     OVARIES AND ADNEXA:     RIGHT:   The right ovary measures 3.3 x 2.6 x 2.8 cm.  Normal appearance with no masses.    LEFT:   The left ovary measures 3.6 x 1.9 x 2.9 cm.  Normal appearance with no masses.       CUL-DE-SAC:   Normal.  No free fluid or mass.    OTHER: Negative.  Bladder appears normal.                 Impression   CONCLUSION:  1. Anteverted uterus is enlarged and slightly lobulated in contour due to underlying subserosal fibroids.  The endometrial stripe is normal in thickness.  2. Unremarkable sonographic appearance of the ovaries.           Dictated by (CST): Alex Love MD on 1/18/2024 at 2:52 PM      Finalized by (CST): Alex Love MD on 1/18/2024 at 2:56 PM             A/P: Patient is 29 year old female     1. Blood loss anemia    2. Intramural and subserous leiomyoma of uterus    3. Menorrhagia, premenopausal  - Sonohysterogram [facility] Only [[id number]]; Future  - Ferrous Sulfate  325 (65 Fe) MG Oral Tab; Take 1 tablet (325 mg total) by mouth in the morning and 1 tablet (325 mg total) before bedtime.  Dispense: 90 tablet; Refill: 3  - CBC, Platelet; No Differential; Future  - tranexamic acid (LYSTEDA) 650 MG Oral Tab; Take 2 tablets (1,300 mg total) by mouth every 8 (eight) hours as needed.  Dispense: 50 tablet; Refill: 2  - TSH W Reflex To Free T4; Future    DW pt will likely need surgical management of fibroids. Will discuss further after sonohys.     Ruth Huddleston MD                [1]   Allergies  Allergen Reactions    Sulfa Antibiotics UNKNOWN and OTHER (SEE COMMENTS)

## 2025-01-22 NOTE — ED INITIAL ASSESSMENT (HPI)
Pt alert, oriented x4.  Arrived from home with her  (Delfino) via private vehicle.  Chief complaint is n/v, syncope \"a couple of times\".      Pt states she started vomiting around 16:00 today after drinking a \"mitchell shot\".  Also has suprapubic pain that radiates bilaterally to side.  Described as throbbing, constant but sharper just before throwing up.  Rated 4/10, but getting worse.  States she had something similar last month, and needed a blood transfusion for anemia.  Had  in 2023, denies other abdominal surgeries.  Has fibroids, last saw her OB/GYN at the end of December.  Periods are regular, heavy flow.     Denies changes to her bowel patterns.  Denies urinary symptoms.  Denies CP, dizziness, blurred vision.  +SOB when vomiting.     Does not take prescribed medication at home, but takes daily supplements:  biotin, vitamin D3, vitamin E, iron, zinc/calcium/magnesium, and a multiple vitamin.        Pt changed into gown and attached to cardiac, NIBP, and SpO2 monitors.  Warm blanket given.  Call light within reach.  Cart low, in locked position.     Statement Selected

## 2025-01-22 NOTE — ED PROVIDER NOTES
Patient Seen in: [facility] Emergency Department      History     Chief Complaint   Patient presents with    Syncope    Nausea/Vomiting/Diarrhea     Stated Complaint: Nausea/Vomiting; Syncope/Seizures?    Subjective:   HPI      30-year-old here accompanied by her  with history of  as well as uterine fibroids with leiomyomas with history of transfusion about 6 weeks ago presents with diffuse mid abdominal pain with nausea and vomiting starting around 4 this afternoon.  No fever.  No diarrhea.  Prior .  No significant urinary symptoms.  No recent bleeding.    Objective:     No pertinent past medical history.            No pertinent past surgical history.              No pertinent social history.                Physical Exam     ED Triage Vitals   BP 25 2345 127/64   Pulse 25 2345 74   Resp 25 2345 17   Temp 25 2352 98.6 °F (37 °C)   Temp src 25 2352 Oral   SpO2 25 100 %   O2 Device 25 None (Room air)       Current Vitals:   Vital Signs  BP: 127/81  Pulse: 68  Resp: 20  MAP (mmHg): 94    Oxygen Therapy  SpO2: 100 %  O2 Device: None (Room air)        Physical Exam  Constitutional: Oriented to person, place, and time.  Appears well-developed. No distress.  Patient does appear uncomfortable though.  Head: Normocephalic and atraumatic.   Eyes: Conjunctivae are normal. Pupils are equal, round, and reactive to light.   Neck: Normal range of motion. Neck supple.   Cardiovascular: Normal rate, regular rhythm and intact distal pulses.    Pulmonary/Chest: Effort normal. No respiratory distress.   Abdominal: Soft.  Diffuse mid nonfocal abdominal pain.  No peritoneal findings.  Musculoskeletal: Normal range of motion. No edema or tenderness.   Neurological: Alert and oriented to person, place, and time.   Skin: Skin is warm and dry.   Psychiatric: Normal mood and affect.  Behavior is normal.   Nursing note and vitals reviewed.    Differential  diagnosis includes acute abdominal pain, viral syndrome and gastroenteritis, dyspepsia and gastritis, pancreatitis, UTI, symptomatic anemia.      ED Course     Labs Reviewed   URINALYSIS WITH CULTURE REFLEX - Abnormal; Notable for the following components:       Result Value    Ketones Urine 80 (*)     Protein Urine 50 (*)     Squamous Epi. Cells Few (*)     All other components within normal limits   BASIC METABOLIC PANEL (8) - Abnormal; Notable for the following components:    Glucose 137 (*)     BUN 7 (*)     BUN/CREA Ratio 7.4 (*)     Calculated Osmolality 298 (*)     All other components within normal limits   HEPATIC FUNCTION PANEL (7) - Abnormal; Notable for the following components:    Albumin 5.0 (*)     All other components within normal limits   CBC WITH DIFFERENTIAL WITH PLATELET - Abnormal; Notable for the following components:    HGB 9.1 (*)     HCT 30.3 (*)     MCV 63.0 (*)     MCH 18.9 (*)     MCHC 30.0 (*)     RDW-SD 68.6 (*)     RDW 31.8 (*)     Immature Platelet Fraction 7.3 (*)     Neutrophil Absolute Prelim 8.74 (*)     Neutrophil Absolute 8.74 (*)     All other components within normal limits   RBC MORPHOLOGY SCAN - Abnormal; Notable for the following components:    RBC Morphology See morphology below (*)     Microcytosis 2+ (*)     Hypochromia 2+ (*)     All other components within normal limits   POCT GLUCOSE - Abnormal; Notable for the following components:    POC Glucose  142 (*)     All other components within normal limits   LIPASE - Normal   POCT PREGNANCY URINE - Normal   RAINBOW DRAW LAVENDER   RAINBOW DRAW LIGHT GREEN   RAINBOW DRAW BLUE   RAINBOW DRAW GOLD     EKG    Rate, intervals and axes as noted on EKG Report.  Rate: 70  Rhythm: Sinus Rhythm  Reading: No acute ischemic changes, mild artifact                         MDM              Medical Decision Making  Patient feeling much improved here after repeat doses of medications.  No indication for transfusion or admission.  Patient  does need to follow-up with a primary care doctor and gynecology.  Recommended continue to prescribe indications.  I will give her some omeprazole and Compazine if needed.  She seemed to do well with this here.  She was encouraged to come back with any worsening or change.  Tylenol safe and likely NSAIDs to but could talk to her doctor first before dosing    Problems Addressed:  Abdominal pain, acute: acute illness or injury  Chronic anemia: chronic illness or injury  Nausea and vomiting, unspecified vomiting type: acute illness or injury with systemic symptoms    Amount and/or Complexity of Data Reviewed  External Data Reviewed: radiology.     Details: 12/2/24 CT abd/pelvis:    PROCEDURE: CT ABDOMEN + PELVIS (CONTRAST ONLY) (CPT=74177)      COMPARISON: None.      INDICATIONS: Syncope, emesis.      TECHNIQUE: CT images of the abdomen and pelvis were obtained with non-ionic intravenous contrast material.  Automated exposure control for dose reduction was used. Adjustment of the mA and/or kV was done based on the patient's size. Use of iterative   reconstruction technique for dose reduction was used.  Dose information is transmitted to the ACR (American College of Radiology) NRDR (National Radiology Data Registry) which includes the Dose Index Registry.      FINDINGS:   Normal lower chest      Normal liver with tiny right hepatic lobe cysts.  Normal portal vein.  Normal gallbladder      Normal pancreas and spleen      Normal adrenals.  Normal-size unobstructed kidneys with no calculi.  Mild urothelial enhancement in the left renal pelvis series 2, image 48.      Normal aorta.  Bowel is unobstructed.  No free air.  Normal appendix.      Uterine fibroids are present including partly rim calcified left uterine fibroid.  Bladder is decompressed      No bone lesion      CONCLUSION: Urothelial enhancement in the left renal pelvis is suggestive of ascending UTI   Finalized by (CST): Riki Alvares MD on 12/02/2024 at 5:14 PM                 Labs: ordered. Decision-making details documented in ED Course.  ECG/medicine tests: ordered and independent interpretation performed. Decision-making details documented in ED Course.    Risk  OTC drugs.  Prescription drug management.        Disposition and Plan     Clinical Impression:  1. Abdominal pain, acute    2. Nausea and vomiting, unspecified vomiting type    3. Chronic anemia         Disposition:  Discharge  1/22/2025  4:12 am    Follow-up:  YOUR PCP    Call            Medications Prescribed:  Discharge Medication List as of 1/22/2025  5:04 AM        START taking these medications    Details   prochlorperazine (COMPAZINE) 10 mg tablet Take 1 tablet (10 mg total) by mouth every 6 (six) hours as needed for Nausea. May take with 25 mg oral Benadryl as needed., Normal, Disp-10 tablet, R-0      omeprazole 20 MG Oral Capsule Delayed Release Take 1 capsule (20 mg total) by mouth daily for 14 days., Normal, Disp-14 capsule, R-0                 Supplementary Documentation:

## 2025-02-04 NOTE — PROGRESS NOTES
Here for sonohys due to heavy vaginal bleeding and severe anemia.   Sonohys performed with no submucous fibroid. 2 fibroids = 1 intramurl and 1 subserosal.  TAMARA her options including OCP's, TXA, Mirena IIUD, abdominal myomectomy. TAMARA pt risk/a/benefits.   She would like Mirena IUD. DW her insertion and SE.   IUD Insertion     Pregnancy Results: UCG negative    LMP:     Consent signed.  Procedure discussed with the patient in detail including indication, risks, benefits, alternatives and complications. I discussed with the patient the procedure.  I discussed the normal SE of break through vaginal bleeding which could last up to 90 days as well as some short term lower abdominal cramping.  I discussed with her to call me if she noticed worsening lower abdominal pain, fever, chills, nausea, vomiting, or a foul vaginal discharge.  I also discussed with her how to feel for the IUD strings in her vagina to insure that the IUD had not been expelled.      Procedure:  Speculum placed in the vagina.  Betadine wash of vagina and cervix.  Single tooth tenaculum was placed at the 12 o'clock position.  Uterus sounded to 8 cm.  Mirena IUD was placed without difficulty.  Strings cut at 3 cm from the os.  Single tooth tenaculum removed.  Good hemostasis noted.  Patient tolerated procedure well.      Visit Plan:  IUD surveillance was discussed with the patient as above.  Written info from the IUD packaging also was given to the patient at the completion of the procedure.

## (undated) NOTE — LETTER
22    RE: Nathaly Belle    : 1995    To Whom It May Concern:    Our patient, Nathaly Belle,      _____Has received treatment in our office on ___________________________. _x____Has been hospitalized on the following dates ___- ___________________.       _____ Has been ill and unable to work from _____________________________.        _____ May resume work / school on ___________________________________.      _____ May resume work / school with the following restrictions ______________    __________________________________________________________.      _____ May participate in physical education. _____ May participate in a prenatal exercise class / yoga class. _____ Patient is pregnant with a due date of Estimated Date of Delivery: 23      __x___ Other _ I recommend that the patient work fro home and avoid travel due to complications from her pregnancy.  ____________________________________________________     __________________________________________________________       Daxa Hassan MD

## (undated) NOTE — LETTER
Lilibeth Lizama, :1995    CONSENT FOR PROCEDURE/SEDATION    1. I authorize the performance upon Lilibeth Lizama  the following: Mirena IUD Insertion     2. I authorize Dr. Ruth Huddleston MD (and whomever is designated as the doctor’s assistant), to perform the above-mentioned procedures.    3. If any unforeseen conditions arise during this procedure calling for additional  procedures, operations, or medications (including anesthesia and blood transfusion), I further request and authorize the doctor to do whatever he/she deems advisable in my interest.    4. I consent to the taking and reproduction of any photographs in the course of this procedure for professional purposes.    5. I consent to the administration of such sedation as may be considered necessary or advisable by the physician responsible for this service, with the exception of ______________________________________________________    6. I have been informed by my doctor of the nature and purpose of this procedure sedation, possible alternative methods of treatment, risk involved and possible complications.      Signature of Patient:_______________________________________________    Signature of person authorized to consent for patient:  _______________________________________________________________    Relationship to patient: ____________________________________________    Witness: _________________________________________ Date:___________     Physician Signature: _______________________________ Date:___________

## (undated) NOTE — LETTER
22    RE: Isabella Level    : 1995    To Whom It May Concern:    Our patient, Isabella Shi,      _____Has received treatment in our office on ___________________________.        _____Has been hospitalized on the following dates ______________________.       _____ Has been ill and unable to work from _____________________________.        _____ May resume work / school on ___________________________________.      _____ May resume work / school with the following restrictions ______________    __________________________________________________________.      _____ May participate in physical education. _____ May participate in a prenatal exercise class / yoga class. _____ Patient is pregnant with a due date of Estimated Date of Delivery: 23      _____ Other _____________________________________________________     __________________________________________________________       Sincerely yours,       No name on file.

## (undated) NOTE — LETTER
Lilibeth Lizama, :1995    CONSENT FOR PROCEDURE/SEDATION    1. I authorize the performance upon Lilibeth Lizama  the following: Sonohysterogram    2. I authorize Dr. Ruth Huddleston MD (and whomever is designated as the doctor’s assistant), to perform the above-mentioned procedures.    3. If any unforeseen conditions arise during this procedure calling for additional  procedures, operations, or medications (including anesthesia and blood transfusion), I further request and authorize the doctor to do whatever he/she deems advisable in my interest.    4. I consent to the taking and reproduction of any photographs in the course of this procedure for professional purposes.    5. I consent to the administration of such sedation as may be considered necessary or advisable by the physician responsible for this service, with the exception of ______________________________________________________    6. I have been informed by my doctor of the nature and purpose of this procedure sedation, possible alternative methods of treatment, risk involved and possible complications.      Signature of Patient:_______________________________________________    Signature of person authorized to consent for patient:  _______________________________________________________________    Relationship to patient: ____________________________________________    Witness: _________________________________________ Date:___________     Physician Signature: _______________________________ Date:___________